# Patient Record
Sex: FEMALE | Race: ASIAN | NOT HISPANIC OR LATINO | URBAN - METROPOLITAN AREA
[De-identification: names, ages, dates, MRNs, and addresses within clinical notes are randomized per-mention and may not be internally consistent; named-entity substitution may affect disease eponyms.]

---

## 2018-01-06 ENCOUNTER — CHARTING TRANS (OUTPATIENT)
Dept: URGENT CARE | Age: 48
End: 2018-01-06

## 2018-01-06 ASSESSMENT — PAIN SCALES - GENERAL: PAINLEVEL_OUTOF10: 0

## 2018-11-27 VITALS
RESPIRATION RATE: 16 BRPM | OXYGEN SATURATION: 96 % | DIASTOLIC BLOOD PRESSURE: 82 MMHG | SYSTOLIC BLOOD PRESSURE: 112 MMHG | HEART RATE: 74 BPM | TEMPERATURE: 97.4 F

## 2019-12-02 ENCOUNTER — TELEPHONE (OUTPATIENT)
Dept: CARDIOLOGY | Age: 49
End: 2019-12-02

## 2020-01-01 ENCOUNTER — EXTERNAL RECORD (OUTPATIENT)
Dept: OTHER | Age: 50
End: 2020-01-01

## 2020-03-02 RX ORDER — LEVOTHYROXINE SODIUM 0.12 MG/1
125 TABLET ORAL DAILY
COMMUNITY
Start: 2018-02-20

## 2020-03-02 RX ORDER — ERGOCALCIFEROL 1.25 MG/1
CAPSULE ORAL
COMMUNITY
Start: 2019-02-19

## 2020-03-03 ENCOUNTER — OFFICE VISIT (OUTPATIENT)
Dept: CARDIOLOGY | Age: 50
End: 2020-03-03

## 2020-03-03 VITALS
DIASTOLIC BLOOD PRESSURE: 70 MMHG | HEIGHT: 63 IN | BODY MASS INDEX: 35.97 KG/M2 | SYSTOLIC BLOOD PRESSURE: 131 MMHG | HEART RATE: 90 BPM | WEIGHT: 203 LBS

## 2020-03-03 DIAGNOSIS — E03.9 ACQUIRED HYPOTHYROIDISM: ICD-10-CM

## 2020-03-03 DIAGNOSIS — R07.2 PRECORDIAL PAIN: Primary | ICD-10-CM

## 2020-03-03 DIAGNOSIS — I15.9 SECONDARY HYPERTENSION: ICD-10-CM

## 2020-03-03 PROCEDURE — 3075F SYST BP GE 130 - 139MM HG: CPT | Performed by: INTERNAL MEDICINE

## 2020-03-03 PROCEDURE — 99245 OFF/OP CONSLTJ NEW/EST HI 55: CPT | Performed by: INTERNAL MEDICINE

## 2020-03-03 PROCEDURE — 93000 ELECTROCARDIOGRAM COMPLETE: CPT | Performed by: INTERNAL MEDICINE

## 2020-03-03 PROCEDURE — 3078F DIAST BP <80 MM HG: CPT | Performed by: INTERNAL MEDICINE

## 2020-03-03 SDOH — HEALTH STABILITY: PHYSICAL HEALTH: ON AVERAGE, HOW MANY MINUTES DO YOU ENGAGE IN EXERCISE AT THIS LEVEL?: 90 MIN

## 2020-03-03 SDOH — HEALTH STABILITY: PHYSICAL HEALTH: ON AVERAGE, HOW MANY DAYS PER WEEK DO YOU ENGAGE IN MODERATE TO STRENUOUS EXERCISE (LIKE A BRISK WALK)?: 3 DAYS

## 2020-03-03 ASSESSMENT — PATIENT HEALTH QUESTIONNAIRE - PHQ9
2. FEELING DOWN, DEPRESSED OR HOPELESS: NOT AT ALL
SUM OF ALL RESPONSES TO PHQ9 QUESTIONS 1 AND 2: 0
SUM OF ALL RESPONSES TO PHQ9 QUESTIONS 1 AND 2: 0
1. LITTLE INTEREST OR PLEASURE IN DOING THINGS: NOT AT ALL

## 2020-05-12 ENCOUNTER — TELEPHONE (OUTPATIENT)
Dept: CARDIOLOGY | Age: 50
End: 2020-05-12

## 2020-05-13 ENCOUNTER — OFFICE VISIT (OUTPATIENT)
Dept: CARDIOLOGY | Age: 50
End: 2020-05-13

## 2020-05-13 VITALS — WEIGHT: 198 LBS | HEIGHT: 63 IN | BODY MASS INDEX: 35.08 KG/M2

## 2020-05-13 DIAGNOSIS — E03.9 ACQUIRED HYPOTHYROIDISM: ICD-10-CM

## 2020-05-13 DIAGNOSIS — R07.2 PRECORDIAL PAIN: Primary | ICD-10-CM

## 2020-05-13 PROCEDURE — 99213 OFFICE O/P EST LOW 20 MIN: CPT | Performed by: INTERNAL MEDICINE

## 2020-05-13 SDOH — HEALTH STABILITY: MENTAL HEALTH: HOW MANY STANDARD DRINKS CONTAINING ALCOHOL DO YOU HAVE ON A TYPICAL DAY?: 1 OR 2

## 2020-05-13 SDOH — HEALTH STABILITY: MENTAL HEALTH: HOW OFTEN DO YOU HAVE A DRINK CONTAINING ALCOHOL?: 2-4 TIMES A MONTH

## 2020-05-13 SDOH — HEALTH STABILITY: PHYSICAL HEALTH: ON AVERAGE, HOW MANY MINUTES DO YOU ENGAGE IN EXERCISE AT THIS LEVEL?: 50 MIN

## 2020-05-13 SDOH — HEALTH STABILITY: PHYSICAL HEALTH: ON AVERAGE, HOW MANY DAYS PER WEEK DO YOU ENGAGE IN MODERATE TO STRENUOUS EXERCISE (LIKE A BRISK WALK)?: 5 DAYS

## 2020-05-13 ASSESSMENT — ENCOUNTER SYMPTOMS
CHILLS: 0
ALLERGIC/IMMUNOLOGIC COMMENTS: NO NEW FOOD ALLERGIES
HEMOPTYSIS: 0
BRUISES/BLEEDS EASILY: 0
WEIGHT LOSS: 0
HEMATOCHEZIA: 0
FEVER: 0
SUSPICIOUS LESIONS: 0
COUGH: 0
WEIGHT GAIN: 0

## 2020-05-29 ENCOUNTER — E-ADVICE (OUTPATIENT)
Dept: CARDIOLOGY | Age: 50
End: 2020-05-29

## 2020-05-29 LAB
ALBUMIN SERPL-MCNC: 4.2 G/DL (ref 3.6–5.1)
ALBUMIN/GLOB SERPL: 1.5 (CALC) (ref 1–2.5)
ALP SERPL-CCNC: 60 U/L (ref 37–153)
ALT SERPL-CCNC: 18 U/L (ref 6–29)
AST SERPL-CCNC: 19 U/L (ref 10–35)
BASOPHILS # BLD AUTO: 118 CELLS/UL (ref 0–200)
BASOPHILS NFR BLD AUTO: 1 %
BILIRUB SERPL-MCNC: 0.5 MG/DL (ref 0.2–1.2)
BUN SERPL-MCNC: 15 MG/DL (ref 7–25)
BUN/CREAT SERPL: NORMAL (CALC) (ref 6–22)
CALCIUM SERPL-MCNC: 9.3 MG/DL (ref 8.6–10.4)
CHLORIDE SERPL-SCNC: 101 MMOL/L (ref 98–110)
CHOLEST SERPL-MCNC: 164 MG/DL
CHOLEST/HDLC SERPL: 2.7 (CALC)
CO2 SERPL-SCNC: 24 MMOL/L (ref 20–32)
CREAT SERPL-MCNC: 0.72 MG/DL (ref 0.5–1.05)
EOSINOPHIL # BLD AUTO: 224 CELLS/UL (ref 15–500)
EOSINOPHIL NFR BLD AUTO: 1.9 %
ERYTHROCYTE [DISTWIDTH] IN BLOOD BY AUTOMATED COUNT: 12.7 % (ref 11–15)
GFRSERPLBLD MDRD-ARVRAT: 98 ML/MIN/1.73M2
GLOBULIN SER CALC-MCNC: 2.8 G/DL (CALC) (ref 1.9–3.7)
GLUCOSE SERPL-MCNC: 75 MG/DL (ref 65–99)
HCT VFR BLD AUTO: 42.9 % (ref 35–45)
HDLC SERPL-MCNC: 61 MG/DL
HGB BLD-MCNC: 14.3 G/DL (ref 11.7–15.5)
LDLC SERPL CALC-MCNC: 82 MG/DL (CALC)
LYMPHOCYTES # BLD AUTO: 3941 CELLS/UL (ref 850–3900)
LYMPHOCYTES NFR BLD AUTO: 33.4 %
MCH RBC QN AUTO: 30.5 PG (ref 27–33)
MCHC RBC AUTO-ENTMCNC: 33.3 G/DL (ref 32–36)
MCV RBC AUTO: 91.5 FL (ref 80–100)
MONOCYTES # BLD AUTO: 838 CELLS/UL (ref 200–950)
MONOCYTES NFR BLD AUTO: 7.1 %
NEUTROPHILS # BLD AUTO: 6679 CELLS/UL (ref 1500–7800)
NEUTROPHILS NFR BLD AUTO: 56.6 %
NONHDLC SERPL-MCNC: 103 MG/DL (CALC)
PLATELET # BLD AUTO: 328 THOUSAND/UL (ref 140–400)
PMV BLD REES-ECKER: 10 FL (ref 7.5–12.5)
POTASSIUM SERPL-SCNC: 4.5 MMOL/L (ref 3.5–5.3)
PROT SERPL-MCNC: 7 G/DL (ref 6.1–8.1)
RBC # BLD AUTO: 4.69 MILLION/UL (ref 3.8–5.1)
SODIUM SERPL-SCNC: 137 MMOL/L (ref 135–146)
TRIGL SERPL-MCNC: 109 MG/DL
WBC # BLD AUTO: 11.8 THOUSAND/UL (ref 3.8–10.8)

## 2020-06-08 ENCOUNTER — LAB ENCOUNTER (OUTPATIENT)
Dept: LAB | Facility: HOSPITAL | Age: 50
End: 2020-06-08
Attending: INTERNAL MEDICINE
Payer: COMMERCIAL

## 2020-06-08 DIAGNOSIS — Z01.812 PRE-PROCEDURE LAB EXAM: Primary | ICD-10-CM

## 2020-06-08 LAB
SARS-COV-2 RNA SPEC QL NAA+PROBE: NOT DETECTED
SPECIMEN SOURCE: NORMAL

## 2020-06-09 ENCOUNTER — APPOINTMENT (OUTPATIENT)
Dept: CARDIOLOGY | Age: 50
End: 2020-06-09

## 2020-06-10 ENCOUNTER — HOSPITAL ENCOUNTER (OUTPATIENT)
Dept: CV DIAGNOSTICS | Age: 50
Discharge: HOME | End: 2020-06-10
Attending: INTERNAL MEDICINE
Payer: COMMERCIAL

## 2020-06-10 ENCOUNTER — APPOINTMENT (OUTPATIENT)
Dept: LAB | Age: 50
End: 2020-06-10
Attending: FAMILY MEDICINE
Payer: COMMERCIAL

## 2020-06-10 DIAGNOSIS — R07.2 PERICARDIAL PAIN: ICD-10-CM

## 2020-06-10 DIAGNOSIS — I15.9 BENIGN SECONDARY HYPERTENSION: ICD-10-CM

## 2020-06-10 DIAGNOSIS — E03.9 HYPOTHYROIDISM: ICD-10-CM

## 2020-06-10 PROCEDURE — 93018 CV STRESS TEST I&R ONLY: CPT | Performed by: INTERNAL MEDICINE

## 2020-06-10 PROCEDURE — 93350 STRESS TTE ONLY: CPT | Performed by: INTERNAL MEDICINE

## 2020-06-10 PROCEDURE — 93017 CV STRESS TEST TRACING ONLY: CPT | Performed by: INTERNAL MEDICINE

## 2020-06-15 ENCOUNTER — TELEPHONE (OUTPATIENT)
Dept: CARDIOLOGY | Age: 50
End: 2020-06-15

## 2020-06-19 RX ORDER — METOPROLOL SUCCINATE 25 MG/1
12.5 TABLET, EXTENDED RELEASE ORAL DAILY
Qty: 15 TABLET | Refills: 11 | Status: SHIPPED | OUTPATIENT
Start: 2020-06-19 | End: 2021-06-25

## 2020-06-25 ENCOUNTER — ORDER TRANSCRIPTION (OUTPATIENT)
Dept: ADMINISTRATIVE | Facility: HOSPITAL | Age: 50
End: 2020-06-25

## 2020-06-25 DIAGNOSIS — Z13.6 ENCOUNTER FOR SCREENING FOR CARDIOVASCULAR DISORDERS: Primary | ICD-10-CM

## 2020-06-26 ENCOUNTER — HOSPITAL ENCOUNTER (OUTPATIENT)
Dept: CT IMAGING | Age: 50
Discharge: HOME | End: 2020-06-26
Attending: FAMILY MEDICINE

## 2020-06-26 DIAGNOSIS — Z13.6 ENCOUNTER FOR SCREENING FOR CARDIOVASCULAR DISORDERS: ICD-10-CM

## 2020-09-08 ENCOUNTER — APPOINTMENT (OUTPATIENT)
Dept: CARDIOLOGY | Age: 50
End: 2020-09-08

## 2020-12-08 ENCOUNTER — V-VISIT (OUTPATIENT)
Dept: CARDIOLOGY | Age: 50
End: 2020-12-08

## 2020-12-08 VITALS
WEIGHT: 197 LBS | DIASTOLIC BLOOD PRESSURE: 70 MMHG | HEIGHT: 63 IN | SYSTOLIC BLOOD PRESSURE: 120 MMHG | HEART RATE: 65 BPM | BODY MASS INDEX: 34.91 KG/M2

## 2020-12-08 DIAGNOSIS — R82.998 URINE WBC INCREASED: ICD-10-CM

## 2020-12-08 DIAGNOSIS — D72.829 LEUKOCYTOSIS, UNSPECIFIED TYPE: ICD-10-CM

## 2020-12-08 DIAGNOSIS — R07.2 PRECORDIAL PAIN: Primary | ICD-10-CM

## 2020-12-08 DIAGNOSIS — R00.2 PALPITATIONS: ICD-10-CM

## 2020-12-08 DIAGNOSIS — E03.9 ACQUIRED HYPOTHYROIDISM: ICD-10-CM

## 2020-12-08 DIAGNOSIS — I47.19 ATRIAL TACHYCARDIA: ICD-10-CM

## 2020-12-08 PROCEDURE — 99214 OFFICE O/P EST MOD 30 MIN: CPT | Performed by: INTERNAL MEDICINE

## 2020-12-08 SDOH — HEALTH STABILITY: PHYSICAL HEALTH: ON AVERAGE, HOW MANY DAYS PER WEEK DO YOU ENGAGE IN MODERATE TO STRENUOUS EXERCISE (LIKE A BRISK WALK)?: 7 DAYS

## 2020-12-08 SDOH — HEALTH STABILITY: MENTAL HEALTH: HOW MANY STANDARD DRINKS CONTAINING ALCOHOL DO YOU HAVE ON A TYPICAL DAY?: 1 OR 2

## 2020-12-08 SDOH — HEALTH STABILITY: PHYSICAL HEALTH: ON AVERAGE, HOW MANY MINUTES DO YOU ENGAGE IN EXERCISE AT THIS LEVEL?: 60 MIN

## 2020-12-08 SDOH — HEALTH STABILITY: MENTAL HEALTH: HOW OFTEN DO YOU HAVE A DRINK CONTAINING ALCOHOL?: 2-4 TIMES A MONTH

## 2020-12-08 ASSESSMENT — ENCOUNTER SYMPTOMS
HEMOPTYSIS: 0
BRUISES/BLEEDS EASILY: 0
HEMATOCHEZIA: 0
CHILLS: 0
WEIGHT LOSS: 0
FEVER: 0
ALLERGIC/IMMUNOLOGIC COMMENTS: NO NEW FOOD ALLERGIES
SUSPICIOUS LESIONS: 0
COUGH: 0
WEIGHT GAIN: 0

## 2020-12-08 ASSESSMENT — PATIENT HEALTH QUESTIONNAIRE - PHQ9
CLINICAL INTERPRETATION OF PHQ2 SCORE: NO FURTHER SCREENING NEEDED
1. LITTLE INTEREST OR PLEASURE IN DOING THINGS: NOT AT ALL
SUM OF ALL RESPONSES TO PHQ9 QUESTIONS 1 AND 2: 0
CLINICAL INTERPRETATION OF PHQ9 SCORE: NO FURTHER SCREENING NEEDED
2. FEELING DOWN, DEPRESSED OR HOPELESS: NOT AT ALL
SUM OF ALL RESPONSES TO PHQ9 QUESTIONS 1 AND 2: 0

## 2021-06-25 RX ORDER — METOPROLOL SUCCINATE 25 MG/1
12.5 TABLET, EXTENDED RELEASE ORAL DAILY
Qty: 15 TABLET | Refills: 3 | Status: SHIPPED | OUTPATIENT
Start: 2021-06-25

## 2022-11-15 ENCOUNTER — PATIENT MESSAGE (OUTPATIENT)
Dept: INTERNAL MEDICINE CLINIC | Facility: CLINIC | Age: 52
End: 2022-11-15

## 2022-11-15 ENCOUNTER — OFFICE VISIT (OUTPATIENT)
Dept: INTERNAL MEDICINE CLINIC | Facility: CLINIC | Age: 52
End: 2022-11-15
Payer: COMMERCIAL

## 2022-11-15 VITALS
RESPIRATION RATE: 14 BRPM | BODY MASS INDEX: 36.19 KG/M2 | WEIGHT: 212 LBS | SYSTOLIC BLOOD PRESSURE: 122 MMHG | HEIGHT: 64.37 IN | OXYGEN SATURATION: 98 % | TEMPERATURE: 98 F | HEART RATE: 71 BPM | DIASTOLIC BLOOD PRESSURE: 70 MMHG

## 2022-11-15 DIAGNOSIS — D17.21 LIPOMA OF RIGHT SHOULDER: ICD-10-CM

## 2022-11-15 DIAGNOSIS — G89.29 CHRONIC RIGHT SHOULDER PAIN: Primary | ICD-10-CM

## 2022-11-15 DIAGNOSIS — M25.511 CHRONIC RIGHT SHOULDER PAIN: Primary | ICD-10-CM

## 2022-11-15 PROBLEM — Z86.39 HISTORY OF GRAVES' DISEASE: Status: ACTIVE | Noted: 2022-11-15

## 2022-11-15 PROBLEM — Z90.710 HISTORY OF HYSTERECTOMY: Status: ACTIVE | Noted: 2022-11-15

## 2022-11-15 PROCEDURE — 90686 IIV4 VACC NO PRSV 0.5 ML IM: CPT | Performed by: INTERNAL MEDICINE

## 2022-11-15 PROCEDURE — 3074F SYST BP LT 130 MM HG: CPT | Performed by: INTERNAL MEDICINE

## 2022-11-15 PROCEDURE — 90471 IMMUNIZATION ADMIN: CPT | Performed by: INTERNAL MEDICINE

## 2022-11-15 PROCEDURE — 3078F DIAST BP <80 MM HG: CPT | Performed by: INTERNAL MEDICINE

## 2022-11-15 PROCEDURE — 3008F BODY MASS INDEX DOCD: CPT | Performed by: INTERNAL MEDICINE

## 2022-11-15 PROCEDURE — 99203 OFFICE O/P NEW LOW 30 MIN: CPT | Performed by: INTERNAL MEDICINE

## 2022-11-15 RX ORDER — ALCLOMETASONE DIPROPIONATE 0.5 MG/G
CREAM TOPICAL
COMMUNITY
Start: 2022-08-20

## 2022-11-15 RX ORDER — LEVOTHYROXINE SODIUM 125 MCG
TABLET ORAL
COMMUNITY
Start: 2022-09-17

## 2022-11-15 RX ORDER — ALBUTEROL SULFATE 90 UG/1
1-2 AEROSOL, METERED RESPIRATORY (INHALATION)
COMMUNITY
Start: 2018-01-06

## 2022-11-15 RX ORDER — ERGOCALCIFEROL 1.25 MG/1
CAPSULE ORAL
COMMUNITY
Start: 2022-09-06

## 2022-11-15 RX ORDER — METOPROLOL SUCCINATE 25 MG/1
TABLET, EXTENDED RELEASE ORAL
COMMUNITY
Start: 2022-09-08

## 2022-11-15 NOTE — PATIENT INSTRUCTIONS
You received a flu vaccine today    Please schedule an appointment with our orthopedic surgeon to see if the lipoma is indeed a cause of your pain

## 2022-11-29 ENCOUNTER — TELEPHONE (OUTPATIENT)
Dept: ORTHOPEDICS CLINIC | Facility: CLINIC | Age: 52
End: 2022-11-29

## 2022-11-29 ENCOUNTER — HOSPITAL ENCOUNTER (OUTPATIENT)
Dept: GENERAL RADIOLOGY | Age: 52
Discharge: HOME | End: 2022-11-29
Attending: INTERNAL MEDICINE
Payer: COMMERCIAL

## 2022-11-29 DIAGNOSIS — G89.29 CHRONIC RIGHT SHOULDER PAIN: ICD-10-CM

## 2022-11-29 DIAGNOSIS — M25.511 CHRONIC RIGHT SHOULDER PAIN: ICD-10-CM

## 2022-11-29 PROCEDURE — 73030 X-RAY EXAM OF SHOULDER: CPT | Performed by: INTERNAL MEDICINE

## 2022-11-30 NOTE — TELEPHONE ENCOUNTER
Impression   CONCLUSION: [facility] joint arthritis. Xrays completed on 11/29/22 with the above findings. No new imaging needed at this time.

## 2022-11-30 NOTE — TELEPHONE ENCOUNTER
Imaging for this patient was completed for the RT SHOULDER, but it needs to be reviewed to see if additional imaging is needed. If so, please enter the appropriate RX and let the patient know to come in before their appointment. Thank you!     Future Appointments   Date Time Provider Anderson Rhodes   12/5/2022  2:00 PM Prema Solis MD [facility] [id number]

## 2022-12-05 ENCOUNTER — OFFICE VISIT (OUTPATIENT)
Dept: ORTHOPEDICS CLINIC | Facility: CLINIC | Age: 52
End: 2022-12-05
Payer: COMMERCIAL

## 2022-12-05 VITALS — HEIGHT: 63 IN | WEIGHT: 205 LBS | BODY MASS INDEX: 36.32 KG/M2

## 2022-12-05 DIAGNOSIS — M75.01 ADHESIVE CAPSULITIS OF RIGHT SHOULDER: Primary | ICD-10-CM

## 2022-12-05 PROCEDURE — 99204 OFFICE O/P NEW MOD 45 MIN: CPT | Performed by: ORTHOPAEDIC SURGERY

## 2022-12-05 PROCEDURE — 3008F BODY MASS INDEX DOCD: CPT | Performed by: ORTHOPAEDIC SURGERY

## 2022-12-05 PROCEDURE — 20610 DRAIN/INJ JOINT/BURSA W/O US: CPT | Performed by: ORTHOPAEDIC SURGERY

## 2022-12-05 RX ORDER — KETOROLAC TROMETHAMINE 30 MG/ML
30 INJECTION, SOLUTION INTRAMUSCULAR; INTRAVENOUS ONCE
Status: COMPLETED | OUTPATIENT
Start: 2022-12-05 | End: 2022-12-05

## 2022-12-05 RX ORDER — TRIAMCINOLONE ACETONIDE 40 MG/ML
40 INJECTION, SUSPENSION INTRA-ARTICULAR; INTRAMUSCULAR ONCE
Status: COMPLETED | OUTPATIENT
Start: 2022-12-05 | End: 2022-12-05

## 2022-12-05 RX ADMIN — TRIAMCINOLONE ACETONIDE 40 MG: 40 INJECTION, SUSPENSION INTRA-ARTICULAR; INTRAMUSCULAR at 14:48:00

## 2022-12-05 RX ADMIN — KETOROLAC TROMETHAMINE 30 MG: 30 INJECTION, SOLUTION INTRAMUSCULAR; INTRAVENOUS at 14:48:00

## 2022-12-05 NOTE — PROCEDURES
Right Shoulder Glenohumeral Joint Injection    Name: Jameel Ramirez   MRN: [id number]  Date: 12/5/2022     Clinical Indications:   Adhesive Capsulitis. After informed consent, the injection site was marked, sterilized with topical chlorhexidine antiseptic, and locally anesthetized with skin refrigerant. The patient was seated upright and the shoulder was exposed. Using sterile technique: 1 mL of 30mg/mL of Ketorolac, 2 mL of 0.5% Bupivicaine, 2 mL of 1% Lidocaine, and 1 mg of 40mg/mL of Triamcinolone (Kenalog) was injected with a Anterior approach utilizing a 21 gauge needle. A band-aid was applied. The patient tolerated the procedure well. Disposition:   Proceed with physical therapy and return for repeat evaluation in 6 weeks. No imaging required at next visit. Rafat Mckeon. Jose Deras MD  Knee, Shoulder, & Elbow Surgery / Sports Medicine Specialist  [facility]  [place] [address], [facility], [facility]0 [facility]. [email or web address]  t: [phone]  o: [phone]  f: [phone]

## 2022-12-29 ENCOUNTER — MED REC SCAN ONLY (OUTPATIENT)
Dept: ORTHOPEDICS CLINIC | Facility: CLINIC | Age: 52
End: 2022-12-29

## 2023-03-16 ENCOUNTER — OFFICE VISIT (OUTPATIENT)
Dept: OBGYN CLINIC | Facility: CLINIC | Age: 53
End: 2023-03-16

## 2023-03-16 ENCOUNTER — LAB ENCOUNTER (OUTPATIENT)
Dept: LAB | Age: 53
End: 2023-03-16
Attending: OBSTETRICS & GYNECOLOGY
Payer: COMMERCIAL

## 2023-03-16 VITALS
SYSTOLIC BLOOD PRESSURE: 124 MMHG | BODY MASS INDEX: 36.5 KG/M2 | RESPIRATION RATE: 18 BRPM | DIASTOLIC BLOOD PRESSURE: 78 MMHG | HEIGHT: 63 IN | WEIGHT: 206 LBS

## 2023-03-16 DIAGNOSIS — F41.9 ANXIETY: ICD-10-CM

## 2023-03-16 DIAGNOSIS — E03.9 HYPOTHYROIDISM, UNSPECIFIED TYPE: Primary | ICD-10-CM

## 2023-03-16 LAB — TSI SER-ACNC: 0.63 MIU/ML (ref 0.36–3.74)

## 2023-03-16 PROCEDURE — 84443 ASSAY THYROID STIM HORMONE: CPT | Performed by: OBSTETRICS & GYNECOLOGY

## 2023-03-16 PROCEDURE — 99396 PREV VISIT EST AGE 40-64: CPT | Performed by: OBSTETRICS & GYNECOLOGY

## 2023-03-16 PROCEDURE — 3008F BODY MASS INDEX DOCD: CPT | Performed by: OBSTETRICS & GYNECOLOGY

## 2023-03-16 PROCEDURE — 3074F SYST BP LT 130 MM HG: CPT | Performed by: OBSTETRICS & GYNECOLOGY

## 2023-03-16 PROCEDURE — 3078F DIAST BP <80 MM HG: CPT | Performed by: OBSTETRICS & GYNECOLOGY

## 2023-03-16 RX ORDER — ESCITALOPRAM OXALATE 10 MG/1
10 TABLET ORAL DAILY
Qty: 90 TABLET | Refills: 4 | Status: SHIPPED | OUTPATIENT
Start: 2023-03-16

## 2023-05-17 RX ORDER — ESTRADIOL 0.5 MG/.5G
1 GEL TOPICAL EVERY EVENING
Qty: 16 EACH | Refills: 0 | Status: SHIPPED | OUTPATIENT
Start: 2023-05-17

## 2023-05-18 ENCOUNTER — TELEPHONE (OUTPATIENT)
Dept: OBGYN CLINIC | Facility: CLINIC | Age: 53
End: 2023-05-18

## 2023-05-18 NOTE — TELEPHONE ENCOUNTER
Pt Name and  verified. Pt informed that they do have samples at [facility] location, confirmed with Elzbieta Royal. Pt CHIARA.

## 2023-05-23 ENCOUNTER — TELEPHONE (OUTPATIENT)
Dept: OBGYN CLINIC | Facility: CLINIC | Age: 53
End: 2023-05-23

## 2023-05-23 RX ORDER — ESTRADIOL 0.5 MG/.5G
1 GEL TOPICAL EVERY EVENING
Qty: 90 EACH | Refills: 0 | Status: SHIPPED | OUTPATIENT
Start: 2023-05-23

## 2023-09-26 RX ORDER — ESTRADIOL 0.5 MG/.5G
GEL TOPICAL
Qty: 90 EACH | Refills: 3 | Status: SHIPPED | OUTPATIENT
Start: 2023-09-26

## 2023-09-28 ENCOUNTER — OFFICE VISIT (OUTPATIENT)
Dept: INTERNAL MEDICINE CLINIC | Facility: CLINIC | Age: 53
End: 2023-09-28
Payer: COMMERCIAL

## 2023-09-28 VITALS
TEMPERATURE: 98 F | SYSTOLIC BLOOD PRESSURE: 122 MMHG | HEIGHT: 63 IN | OXYGEN SATURATION: 97 % | HEART RATE: 62 BPM | RESPIRATION RATE: 14 BRPM | BODY MASS INDEX: 39.72 KG/M2 | WEIGHT: 224.19 LBS | DIASTOLIC BLOOD PRESSURE: 68 MMHG

## 2023-09-28 DIAGNOSIS — E66.9 CLASS 2 OBESITY WITHOUT SERIOUS COMORBIDITY WITH BODY MASS INDEX (BMI) OF 39.0 TO 39.9 IN ADULT, UNSPECIFIED OBESITY TYPE: ICD-10-CM

## 2023-09-28 DIAGNOSIS — F41.9 ANXIETY: ICD-10-CM

## 2023-09-28 DIAGNOSIS — Z00.00 ROUTINE PHYSICAL EXAMINATION: Primary | ICD-10-CM

## 2023-09-28 DIAGNOSIS — E78.2 MIXED HYPERLIPIDEMIA: ICD-10-CM

## 2023-09-28 DIAGNOSIS — Z12.31 SCREENING MAMMOGRAM FOR BREAST CANCER: ICD-10-CM

## 2023-09-28 DIAGNOSIS — Z86.39 HISTORY OF GRAVES' DISEASE: ICD-10-CM

## 2023-09-28 PROBLEM — Z82.49 FAMILY HISTORY OF HEART DISEASE: Status: ACTIVE | Noted: 2023-09-28

## 2023-09-28 PROBLEM — E66.812 CLASS 2 OBESITY WITHOUT SERIOUS COMORBIDITY WITH BODY MASS INDEX (BMI) OF 39.0 TO 39.9 IN ADULT: Status: ACTIVE | Noted: 2023-09-28

## 2023-09-28 LAB
TSH: 0.45 UIU/ML
VITAMIN D, 25-HYDROXY: 54 NG/ML

## 2023-09-28 PROCEDURE — 3078F DIAST BP <80 MM HG: CPT | Performed by: INTERNAL MEDICINE

## 2023-09-28 PROCEDURE — 99396 PREV VISIT EST AGE 40-64: CPT | Performed by: INTERNAL MEDICINE

## 2023-09-28 PROCEDURE — 90686 IIV4 VACC NO PRSV 0.5 ML IM: CPT | Performed by: INTERNAL MEDICINE

## 2023-09-28 PROCEDURE — 3074F SYST BP LT 130 MM HG: CPT | Performed by: INTERNAL MEDICINE

## 2023-09-28 PROCEDURE — 3008F BODY MASS INDEX DOCD: CPT | Performed by: INTERNAL MEDICINE

## 2023-09-28 PROCEDURE — 90471 IMMUNIZATION ADMIN: CPT | Performed by: INTERNAL MEDICINE

## 2023-09-28 RX ORDER — ROSUVASTATIN CALCIUM 5 MG/1
5 TABLET, COATED ORAL DAILY
COMMUNITY
Start: 2023-06-26

## 2023-09-28 RX ORDER — CETIRIZINE HYDROCHLORIDE 10 MG/1
10 TABLET ORAL DAILY
COMMUNITY

## 2023-09-28 NOTE — PATIENT INSTRUCTIONS
Continue to exercise at least 150 minutes a week and Eat a plant based diet     Please take 2000 IU of vitamin D daily to keep your bones strong    You can talk to Dr. Mike Mckeon to see if you can take crestor every other day due to the muscle aches or try pravastatin ( a different cholesterol pill). Also ask Dr. Mike Mckeon if phentermine can be given for you for weight loss (just short term like 6 months) if we need that in the future. Another weight loss medication is topamax that we give for migraines. Most common side effects are numbness/hair loss/drowsiness    Try intermittent fasting (fasting for 12-16 hours). Avoid carbohydrates at night time    I do recommend the shingles vaccine (2 dose series).  You received the flu vaccine today    I have ordered a mammogram as well    See you yearly for a physical

## 2023-09-29 ENCOUNTER — PATIENT MESSAGE (OUTPATIENT)
Dept: INTERNAL MEDICINE CLINIC | Facility: CLINIC | Age: 53
End: 2023-09-29

## 2023-09-29 DIAGNOSIS — E66.9 CLASS 2 OBESITY WITHOUT SERIOUS COMORBIDITY WITH BODY MASS INDEX (BMI) OF 39.0 TO 39.9 IN ADULT, UNSPECIFIED OBESITY TYPE: Primary | ICD-10-CM

## 2023-10-02 RX ORDER — PHENTERMINE HYDROCHLORIDE 37.5 MG/1
37.5 TABLET ORAL
Qty: 60 TABLET | Refills: 0 | Status: SHIPPED | OUTPATIENT
Start: 2023-10-02

## 2023-10-02 NOTE — TELEPHONE ENCOUNTER
From: Khushbu Turcios  To: Cydney Concepcionus  Sent: 9/29/2023 4:09 PM CDT  Subject: HS CRP    Saraylo Dr. Inez Bedolla  The cardiologist, Dr Oj Gomez, wanted me to ask you about my elevated HS CRP 3.5 from the 9/12/2023 blood draw. Would you provide feedback that I may share with her? Additionally, the cardiologist approved phentermine for a short term use. After thinking about it, I would be interested in giving it a try. Would you be able to call that into [facility] on [address] [phone]. Since we are only talking short term use?    Warm regards,  Violeta Lynneaw

## 2023-11-04 ENCOUNTER — OFFICE VISIT (OUTPATIENT)
Dept: INTERNAL MEDICINE CLINIC | Facility: CLINIC | Age: 53
End: 2023-11-04
Payer: COMMERCIAL

## 2023-11-04 VITALS
BODY MASS INDEX: 38.59 KG/M2 | HEART RATE: 63 BPM | DIASTOLIC BLOOD PRESSURE: 72 MMHG | WEIGHT: 217.81 LBS | HEIGHT: 63 IN | OXYGEN SATURATION: 98 % | RESPIRATION RATE: 14 BRPM | TEMPERATURE: 98 F | SYSTOLIC BLOOD PRESSURE: 122 MMHG

## 2023-11-04 DIAGNOSIS — E66.9 CLASS 2 OBESITY WITHOUT SERIOUS COMORBIDITY WITH BODY MASS INDEX (BMI) OF 39.0 TO 39.9 IN ADULT, UNSPECIFIED OBESITY TYPE: Primary | ICD-10-CM

## 2023-11-04 DIAGNOSIS — Z82.49 FAMILY HISTORY OF HEART DISEASE: ICD-10-CM

## 2023-11-04 PROCEDURE — 99214 OFFICE O/P EST MOD 30 MIN: CPT | Performed by: INTERNAL MEDICINE

## 2023-11-04 PROCEDURE — 3078F DIAST BP <80 MM HG: CPT | Performed by: INTERNAL MEDICINE

## 2023-11-04 PROCEDURE — 3074F SYST BP LT 130 MM HG: CPT | Performed by: INTERNAL MEDICINE

## 2023-11-04 PROCEDURE — 3008F BODY MASS INDEX DOCD: CPT | Performed by: INTERNAL MEDICINE

## 2023-11-04 RX ORDER — PHENTERMINE HYDROCHLORIDE 37.5 MG/1
37.5 TABLET ORAL
Qty: 90 TABLET | Refills: 0 | Status: SHIPPED | OUTPATIENT
Start: 2023-11-04

## 2023-11-04 NOTE — PATIENT INSTRUCTIONS
Great job on the weight loss. Lets continue the medication for another 3 months.   I agree with keeping a reminder to eat as it is important to eat as well    Continue metoprolol and cholesterol medication    Get your shingles vaccine through the pharmacy and it is a 2 dose series and it will cause fatigue for 24 to 48 hours    See me back in 3 months for a weight check

## 2023-12-04 ENCOUNTER — HOSPITAL ENCOUNTER (OUTPATIENT)
Dept: MAMMOGRAPHY | Age: 53
Discharge: HOME | End: 2023-12-04
Attending: INTERNAL MEDICINE
Payer: COMMERCIAL

## 2023-12-04 DIAGNOSIS — Z12.31 SCREENING MAMMOGRAM FOR BREAST CANCER: ICD-10-CM

## 2023-12-04 PROCEDURE — 77063 BREAST TOMOSYNTHESIS BI: CPT | Performed by: INTERNAL MEDICINE

## 2023-12-04 PROCEDURE — 77067 SCR MAMMO BI INCL CAD: CPT | Performed by: INTERNAL MEDICINE

## 2023-12-12 ENCOUNTER — HOSPITAL ENCOUNTER (OUTPATIENT)
Dept: ULTRASOUND IMAGING | Age: 53
Discharge: HOME | End: 2023-12-12
Attending: INTERNAL MEDICINE
Payer: COMMERCIAL

## 2023-12-12 ENCOUNTER — HOSPITAL ENCOUNTER (OUTPATIENT)
Dept: MAMMOGRAPHY | Age: 53
Discharge: HOME | End: 2023-12-12
Attending: INTERNAL MEDICINE
Payer: COMMERCIAL

## 2023-12-12 DIAGNOSIS — R92.2 INCONCLUSIVE MAMMOGRAM: ICD-10-CM

## 2023-12-12 PROCEDURE — 77061 BREAST TOMOSYNTHESIS UNI: CPT | Performed by: INTERNAL MEDICINE

## 2023-12-12 PROCEDURE — 76642 ULTRASOUND BREAST LIMITED: CPT | Performed by: INTERNAL MEDICINE

## 2023-12-12 PROCEDURE — 77065 DX MAMMO INCL CAD UNI: CPT | Performed by: INTERNAL MEDICINE

## 2023-12-13 DIAGNOSIS — N63.20 MASS OF LEFT BREAST, UNSPECIFIED QUADRANT: Primary | ICD-10-CM

## 2024-01-30 LAB
ABSOLUTE BASOPHILS: 100 CELLS/UL (ref 0–200)
ABSOLUTE EOSINOPHILS: 250 CELLS/UL (ref 15–500)
ABSOLUTE LYMPHOCYTES: 3190 CELLS/UL (ref 850–3900)
ABSOLUTE MONOCYTES: 740 CELLS/UL (ref 200–950)
ABSOLUTE NEUTROPHILS: 5720 CELLS/UL (ref 1500–7800)
BASOPHILS: 1 %
EOSINOPHILS: 2.5 %
HEMATOCRIT: 43.5 % (ref 35–45)
HEMOGLOBIN: 14.2 G/DL (ref 11.7–15.5)
LYMPHOCYTES: 31.9 %
MCH: 30.1 PG (ref 27–33)
MCHC: 32.6 G/DL (ref 32–36)
MCV: 92.2 FL (ref 80–100)
MONOCYTES: 7.4 %
MPV: 9.7 FL (ref 7.5–12.5)
NEUTROPHILS: 57.2 %
PLATELET COUNT: 333 THOUSAND/UL (ref 140–400)
RDW: 12.5 % (ref 11–15)
RED BLOOD CELL COUNT: 4.72 MILLION/UL (ref 3.8–5.1)
WHITE BLOOD CELL COUNT: 10 THOUSAND/UL (ref 3.8–10.8)

## 2024-02-08 ENCOUNTER — OFFICE VISIT (OUTPATIENT)
Dept: INTERNAL MEDICINE CLINIC | Facility: CLINIC | Age: 54
End: 2024-02-08
Payer: COMMERCIAL

## 2024-02-08 VITALS
HEART RATE: 88 BPM | HEIGHT: 63 IN | WEIGHT: 226.38 LBS | SYSTOLIC BLOOD PRESSURE: 122 MMHG | TEMPERATURE: 98 F | RESPIRATION RATE: 14 BRPM | OXYGEN SATURATION: 96 % | BODY MASS INDEX: 40.11 KG/M2 | DIASTOLIC BLOOD PRESSURE: 74 MMHG

## 2024-02-08 DIAGNOSIS — R06.02 SOB (SHORTNESS OF BREATH): ICD-10-CM

## 2024-02-08 DIAGNOSIS — E66.9 CLASS 2 OBESITY WITHOUT SERIOUS COMORBIDITY WITH BODY MASS INDEX (BMI) OF 39.0 TO 39.9 IN ADULT, UNSPECIFIED OBESITY TYPE: Primary | ICD-10-CM

## 2024-02-08 DIAGNOSIS — R21 RASH: ICD-10-CM

## 2024-02-08 PROBLEM — G47.33 OSA ON CPAP: Status: ACTIVE | Noted: 2024-02-08

## 2024-02-08 PROCEDURE — 3078F DIAST BP <80 MM HG: CPT | Performed by: INTERNAL MEDICINE

## 2024-02-08 PROCEDURE — 3074F SYST BP LT 130 MM HG: CPT | Performed by: INTERNAL MEDICINE

## 2024-02-08 PROCEDURE — 99213 OFFICE O/P EST LOW 20 MIN: CPT | Performed by: INTERNAL MEDICINE

## 2024-02-08 PROCEDURE — 90471 IMMUNIZATION ADMIN: CPT | Performed by: INTERNAL MEDICINE

## 2024-02-08 PROCEDURE — 90750 HZV VACC RECOMBINANT IM: CPT | Performed by: INTERNAL MEDICINE

## 2024-02-08 PROCEDURE — 3008F BODY MASS INDEX DOCD: CPT | Performed by: INTERNAL MEDICINE

## 2024-02-08 RX ORDER — TIRZEPATIDE 2.5 MG/.5ML
2.5 INJECTION, SOLUTION SUBCUTANEOUS WEEKLY
Qty: 1 ML | Refills: 0 | Status: SHIPPED | OUTPATIENT
Start: 2024-02-08

## 2024-02-08 RX ORDER — PRENATAL VIT 91/IRON/FOLIC/DHA 28-975-200
COMBINATION PACKAGE (EA) ORAL
Qty: 42 G | Refills: 0 | Status: SHIPPED | OUTPATIENT
Start: 2024-02-08

## 2024-02-08 RX ORDER — PHENTERMINE HYDROCHLORIDE 15 MG/1
CAPSULE ORAL
Qty: 7 CAPSULE | Refills: 0 | Status: SHIPPED | OUTPATIENT
Start: 2024-02-08

## 2024-02-08 NOTE — PROGRESS NOTES
Patient Office Visit    ASSESSMENT AND PLAN:   1. Class 2 obesity without serious comorbidity with body mass index (BMI) of 39.0 to 39.9 in adult, unspecified obesity type  Note: Will wean off phentermine as below.  Discussed about zepbound.  She denies any personal or family history of medullary thyroid cancer or pancreatitis.  Discussed the side effects of nausea, vomiting, diarrhea or constipation.  If approved and patient will follow-up with the nurse for teaching.  Referral to the dietitian provided as well.  - Phentermine HCl 15 MG Oral Cap; Take every other day for 2 weeks then stop  Dispense: 7 capsule; Refill: 0  - Tirzepatide-Weight Management (ZEPBOUND) 2.5 MG/0.5ML Subcutaneous Solution Auto-injector; Inject 2.5 mg into the skin once a week.  Dispense: 1 mL; Refill: 0  - DIETITIAN EDUCATION INITIAL, DIET (INTERNAL)    2. SOB (shortness of breath)  Note: This could be weight related however will order chest x-ray and patient has an appointment with  her cardiologist today  - XR CHEST PA + LAT CHEST (CPT=71046); Future    3. Rash  Note: Will try treatment below and has a follow-up appointment with dermatology  - terbinafine 1 % External Cream; Apply twice a day to affected area for at least 4 weeks  Dispense: 42 g; Refill: 0    Return to clinic in 6 weeks  Shingles vaccine provided      Patient/Caregiver Education: Patient/Caregiver Education: There are no barriers to learning. Medical education done. Outcome: Patient verbalizes understanding. Patient is notified to call with any questions, complications, allergies, or worsening or changing symptoms.  Patient is to call with any side effects or complications from the treatments as a result of today.      Reviewed Past Medical History and   Patient Active Problem List   Diagnosis    History of hysterectomy    History of Graves' disease    Family history of heart disease    Anxiety    Mixed hyperlipidemia    Class 2 obesity without serious comorbidity with  body mass index (BMI) of 39.0 to 39.9 in adult       Orders Placed This Encounter   Procedures    Zoster Recombinant Adjuvanted (Shingrix -Shingles) [09545]     Requested Prescriptions     Signed Prescriptions Disp Refills    Phentermine HCl 15 MG Oral Cap 7 capsule 0     Sig: Take every other day for 2 weeks then stop    Tirzepatide-Weight Management (ZEPBOUND) 2.5 MG/0.5ML Subcutaneous Solution Auto-injector 1 mL 0     Sig: Inject 2.5 mg into the skin once a week.    terbinafine 1 % External Cream 42 g 0     Sig: Apply twice a day to affected area for at least 4 weeks         Cydney Ovalles DO  CC:  Chief Complaint   Patient presents with    Weight Check         HPI:   Sonja Hines is a 53-year-old female who presents for a weight check    Obesity: Phentermine is not working anymore and she has gained more weight.  She is interested in trying something different.  She is trying to work on diet and lifestyle changes however she has noticed some labored breathing when she is trying to work out for a longer time.  She denies any recent viral infections.  She has been compliant with her CPAP machine as well.  Denies any chest pain and will be following up with her cardiologist today.  Interested in seeing a nutritionist  Rash: Has noticed yellow dots on the chest which are increasing in size and amount.  It does get itchy.  No new products reported.    Past Medical History:   Diagnosis Date    Allergic rhinitis     Anxiety     Not formally diagnosed    Graves disease     Hyperthyroidism     Graves disease. Had AJ now on Synthoid    Hypothyroidism     After AJ    Sleep apnea        Past Surgical History:   Procedure Laterality Date    D & C      HYSTERECTOMY  2018    partial hystero.      ,     SKIN SURGERY      TONSILLECTOMY         Social History:  Social History     Socioeconomic History    Marital status:    Tobacco Use    Smoking status: Never    Smokeless tobacco: Never   Vaping  Use    Vaping Use: Never used   Substance and Sexual Activity    Alcohol use: Yes     Comment: Occasionally when out to dinner with friends    Drug use: Not Currently     Types: Cannabis   Other Topics Concern    Caffeine Concern No    Exercise Yes     Comment: 1-3x per week    Seat Belt Yes    Special Diet Yes     Comment: Avoid dairy, lactose intolerance    Stress Concern No    Weight Concern Yes     Family History:  Family History   Problem Relation Age of Onset    Heart Disorder Father     Diabetes Father     Hypertension Father     Heart Disorder Mother     Hypertension Mother     Hypertension Sister      Allergies:  Allergies   Allergen Reactions    Azithromycin HIVES    Erythromycin OTHER (SEE COMMENTS) and UNKNOWN    Penicillins HIVES and OTHER (SEE COMMENTS)    Sulfa Antibiotics HIVES    Amoxicillin DIZZINESS    Milk Protein Extract OTHER (SEE COMMENTS)     Current Meds:  Current Outpatient Medications on File Prior to Visit   Medication Sig Dispense Refill    rosuvastatin 5 MG Oral Tab Take 1 tablet (5 mg total) by mouth daily.      cetirizine 10 MG Oral Tab Take 1 tablet (10 mg total) by mouth daily.      Estradiol 0.5 MG/0.5GM Transdermal Gel APPLY 1 DOSE TO THE AFFECTED AREA EVERY EVENING 90 each 3    ergocalciferol 1.25 MG (51022 UT) Oral Cap       escitalopram (LEXAPRO) 10 MG Oral Tab Take 1 tablet (10 mg total) by mouth daily. 90 tablet 4    metoprolol succinate ER 25 MG Oral Tablet 24 Hr Take 1 tablet (25 mg total) by mouth daily. Half of 25mg      SYNTHROID 125 MCG Oral Tab       B Complex-C-Folic Acid (B-COMPLEX/VITAMIN C OR) Take 1 tablet by mouth daily.       No current facility-administered medications on file prior to visit.         REVIEW OF SYSTEMS   Constitutional: no fatigue normal energy no weight changes   HENT: normal sinuses and no mouth issues   Eyes: . normal vision no eye pain   Respiratory: normal respirations no cough   Cardiovascular: no CP, or palpitations   Gastrointestinal:  normal bowels and no abd pains   Genitourinary:  normal urination no hematuria, no frequency   Musculoskeletal: no pains in arms/legs, normal range of motion   Skin: as above   Neurological:  no weakness, no numbness, normal gait   Hematological:  no bruises or bleeding   Psychiatric/Behavioral: normal mood no anxiety normal behavior     /74 (BP Location: Right arm, Patient Position: Sitting, Cuff Size: adult)   Pulse 88   Temp 98.1 °F (36.7 °C) (Temporal)   Resp 14   Ht 5' 3\" (1.6 m)   Wt 226 lb 6.4 oz (102.7 kg)   SpO2 96%   BMI 40.10 kg/m²     PHYSICAL EXAM:   Constitutional: Vital signs reviewed as noted, well developed, in no acute distress.   HENT: NCAT  Eyes: pupils reactive bilaterally  Cardiovascular: nl s1 s2 no m/r/g  Pulmonary/Chest: CTA bilaterally with no wheezes  Extremities: no pedal edema   Neurological:  no weakness in UE and LE, reflexes are normal  Skin: Clearing of the skin noted on the chest  Psychiatric:normal mood

## 2024-02-08 NOTE — PATIENT INSTRUCTIONS
Lets wean off phentermine, but taking 15 mg every other day for 2 weeks then stop.    They will see if zepbound is approved.  You did not mention any personal or family history of medullary thyroid cancer or pancreatitis.  Most common side effects of constipation, diarrhea, nausea and vomiting    If medication is approved then please come schedule an appointment with the nurse    You received the shingles vaccine today    I have referred you to the dietitian  I have ordered a chest x-ray for you  Try the over-the-counter athlete's foot cream for the rash and follow-up with the dermatologist

## 2024-02-16 ENCOUNTER — PATIENT MESSAGE (OUTPATIENT)
Dept: INTERNAL MEDICINE CLINIC | Facility: CLINIC | Age: 54
End: 2024-02-16

## 2024-02-16 DIAGNOSIS — E66.9 CLASS 2 OBESITY WITHOUT SERIOUS COMORBIDITY WITH BODY MASS INDEX (BMI) OF 39.0 TO 39.9 IN ADULT, UNSPECIFIED OBESITY TYPE: Primary | ICD-10-CM

## 2024-02-19 NOTE — TELEPHONE ENCOUNTER
PA form received  PA filled and fax back to [facility]   Confirmation received  Awaiting payor response

## 2024-02-26 RX ORDER — TOPIRAMATE 25 MG/1
25 TABLET ORAL NIGHTLY
Qty: 30 TABLET | Refills: 0 | Status: SHIPPED | OUTPATIENT
Start: 2024-02-26

## 2024-03-04 NOTE — TELEPHONE ENCOUNTER
Medication Quantity Refills Start End   escitalopram (LEXAPRO) 10 MG Oral Tab 90 tablet 4 3/16/2023 --   Sig:   Take 1 tablet (10 mg total) by mouth daily.     Route:   Oral

## 2024-03-05 RX ORDER — ESCITALOPRAM OXALATE 10 MG/1
10 TABLET ORAL DAILY
Qty: 90 TABLET | Refills: 4 | Status: SHIPPED | OUTPATIENT
Start: 2024-03-05

## 2024-03-18 DIAGNOSIS — E66.9 CLASS 2 OBESITY WITHOUT SERIOUS COMORBIDITY WITH BODY MASS INDEX (BMI) OF 39.0 TO 39.9 IN ADULT, UNSPECIFIED OBESITY TYPE: ICD-10-CM

## 2024-03-19 ENCOUNTER — HOSPITAL ENCOUNTER (OUTPATIENT)
Dept: GENERAL RADIOLOGY | Age: 54
Discharge: HOME | End: 2024-03-19
Attending: INTERNAL MEDICINE
Payer: COMMERCIAL

## 2024-03-19 ENCOUNTER — PATIENT MESSAGE (OUTPATIENT)
Dept: INTERNAL MEDICINE CLINIC | Facility: CLINIC | Age: 54
End: 2024-03-19

## 2024-03-19 DIAGNOSIS — R06.02 SOB (SHORTNESS OF BREATH): ICD-10-CM

## 2024-03-19 DIAGNOSIS — R06.02 SOB (SHORTNESS OF BREATH): Primary | ICD-10-CM

## 2024-03-19 DIAGNOSIS — E66.9 CLASS 2 OBESITY WITHOUT SERIOUS COMORBIDITY WITH BODY MASS INDEX (BMI) OF 39.0 TO 39.9 IN ADULT, UNSPECIFIED OBESITY TYPE: ICD-10-CM

## 2024-03-19 PROCEDURE — 71046 X-RAY EXAM CHEST 2 VIEWS: CPT | Performed by: INTERNAL MEDICINE

## 2024-03-19 RX ORDER — ALBUTEROL SULFATE 90 UG/1
1 AEROSOL, METERED RESPIRATORY (INHALATION) EVERY 6 HOURS PRN
Qty: 18 G | Refills: 0 | Status: SHIPPED | OUTPATIENT
Start: 2024-03-19

## 2024-03-19 RX ORDER — TIRZEPATIDE 2.5 MG/.5ML
2.5 INJECTION, SOLUTION SUBCUTANEOUS WEEKLY
Qty: 1 ML | Refills: 0 | Status: SHIPPED | OUTPATIENT
Start: 2024-03-19

## 2024-03-19 RX ORDER — TOPIRAMATE 25 MG/1
25 TABLET ORAL NIGHTLY
Qty: 30 TABLET | Refills: 0 | OUTPATIENT
Start: 2024-03-19

## 2024-03-19 NOTE — TELEPHONE ENCOUNTER
X-ray from [facility]     Procedure Note    Lesvia Coyne MD - 10/24/2019  Formatting of this note might be different from the original.        Portable chest    History: Shortness of breath.    Technique: Single frontal view of the chest    Comparison: None.    Findings: Heart and Mediastinum are normal. Lung fields are clear. There is no evidence of pneumothorax or pleural effusion. Bony structures are grossly intact.    IMPRESSION:  Impression: No acute intrathoracic abnormality  Exam End: 10/23/19 10:48 PM    Specimen Collected: 10/24/19  7:51 AM Last Resulted: 10/24/19  7:51 AM   Received From: [facility]  Result Received: 12/04/23  1:23 PM    View Encounter      CT from [facility]    IMPRESSION:  Impression:    1. No evidence of pulmonary embolus or aortic dissection.    2. Mild enlargement of the main pulmonary artery measuring up to 2.4 cm, suggesting pulmonary hypertension in the appropriate clinical setting.    3. Scattered groundglass opacities predominantly within the mid and lower lung zones likely related to reactive small airways disease or mild pulmonary edema.  Exam End: 10/23/19 10:57 PM    Specimen Collected: 10/24/19  7:22 AM Last Resulted: 10/24/19  7:24 AM   Received From: [facility]  Result Received: 12/04/23  1:23 PM    View Encounter

## 2024-03-19 NOTE — TELEPHONE ENCOUNTER
From: Sonja Hines  To: Cydney Que Josr  Sent: 3/19/2024 10:56 AM CDT  Subject: Chest X-ray results     Natalia Richards,  Thank you for quickly reading and letting me know the results. Back in 2019, I also had a chest X-ray at [facility]. Would it be useful for me to get a copy so we could do a comparison? I am still having labored breathing. Not sure what if any next steps make sense.   Thanks for your guidance,   Sonja

## 2024-03-28 ENCOUNTER — PATIENT MESSAGE (OUTPATIENT)
Dept: INTERNAL MEDICINE CLINIC | Facility: CLINIC | Age: 54
End: 2024-03-28

## 2024-03-28 DIAGNOSIS — M35.01 SJOGREN'S SYNDROME WITH KERATOCONJUNCTIVITIS SICCA (HCC): Primary | ICD-10-CM

## 2024-03-28 NOTE — TELEPHONE ENCOUNTER
Starting zepbound should have no bearing on the positive NITIN test.  As far as wanting instructions about how to take the zepboundshe can go ahead and set up a nurse visit  I will forward this to Dr. Kilgore for her input if necessary

## 2024-03-28 NOTE — TELEPHONE ENCOUNTER
From: Sonja Hines  To: Cydney Ovalles  Sent: 3/28/2024 6:36 AM CDT  Subject: Zepbound Sjogrens?    Hello,  Was at dermatologist office for chest rash. She ran an NITIN blood lab. Tried to fax results to your office and attached here. I am not sure if I’m reading the results indicating Sjögren Syndrome?  Also, picked up Zepbound. 1) am I supposed to book time with your nurse/PA to go over administration? 2) does potential Sjögren dx even make sense to start the Zepbound?    Thanks

## 2024-04-05 ENCOUNTER — NURSE ONLY (OUTPATIENT)
Dept: INTERNAL MEDICINE CLINIC | Facility: CLINIC | Age: 54
End: 2024-04-05
Payer: COMMERCIAL

## 2024-04-05 NOTE — PROGRESS NOTES
Patient arrived for nurse visit for [employer] teaching. Discussed with patient how to use, safety precautions, prepping the skin, administering and storing/disposing the medication. Demonstrated technique and patient administered herself the first dose. Patient tolerated well. Addressed all questions and discussed the importance of diet/exercise and lifestyle changes. Handout printed from [employer] and provided to patient for reference. Patient v/u to all.

## 2024-04-11 ENCOUNTER — TELEPHONE (OUTPATIENT)
Dept: INTERNAL MEDICINE CLINIC | Facility: CLINIC | Age: 54
End: 2024-04-11

## 2024-04-11 LAB
ALT: 19
ANACHOICE(R) SCREEN: POSITIVE
AST: 17
CALCIUM: 9.6
CREATININE: 0.62 MG/DL (ref 0.6–1.2)
DNA (DS) ANTIBODY: 1
GLUCOSE: 96
HEMOGLOBIN: 14.9 G/DL (ref 13–17)
PLATELET COUNT: 358
POTASSIUM: 4.2
SODIUM: 136
WHITE BLOOD CELL COUNT: 9.8

## 2024-04-19 ENCOUNTER — PATIENT MESSAGE (OUTPATIENT)
Dept: INTERNAL MEDICINE CLINIC | Facility: CLINIC | Age: 54
End: 2024-04-19

## 2024-04-19 DIAGNOSIS — E66.9 CLASS 2 OBESITY WITHOUT SERIOUS COMORBIDITY WITH BODY MASS INDEX (BMI) OF 39.0 TO 39.9 IN ADULT, UNSPECIFIED OBESITY TYPE: ICD-10-CM

## 2024-04-19 DIAGNOSIS — R06.83 SNORING: Primary | ICD-10-CM

## 2024-04-19 DIAGNOSIS — R53.83 OTHER FATIGUE: ICD-10-CM

## 2024-04-22 RX ORDER — TIRZEPATIDE 5 MG/.5ML
5 INJECTION, SOLUTION SUBCUTANEOUS WEEKLY
Qty: 2 ML | Refills: 2 | Status: SHIPPED | OUTPATIENT
Start: 2024-04-22 | End: 2024-05-14

## 2024-04-22 NOTE — TELEPHONE ENCOUNTER
From: Sonja Hines  To: Cydney Ovalles  Sent: 4/19/2024 12:13 PM CDT  Subject: Request Labs    Natalia Richards,  Thank you for your reminder about labs. Wondering since I’m already going to schedule that lab, if you would consider running labs for my fatigue. It has gotten worse over the last couple of months. Do you think we should check for Vitamin D, B and Iron? Or something else? I have been falling asleep at my desk and sometimes even while driving. And my commute is only 25 minutes! Let me know your thoughts or if I should come in to discuss.     Also, I am going to administer my 3rd, 2.5mg Zepbound today. Do you need to see me before my next refill?     Best regards,  Sonja

## 2024-04-22 NOTE — TELEPHONE ENCOUNTER
Yes she can follow-up with me.  In the meantime I do not want her to drive if she is feeling drowsy and I have ordered a sleep study for her as sleep apnea can often times cause drowsiness and fatigue.  I have ordered blood work for her as well.  Cydney Ovalles DO

## 2024-04-26 ENCOUNTER — RT VISIT (OUTPATIENT)
Dept: RESPIRATORY THERAPY | Facility: HOSPITAL | Age: 54
End: 2024-04-26
Attending: INTERNAL MEDICINE
Payer: COMMERCIAL

## 2024-04-26 DIAGNOSIS — R06.02 SOB (SHORTNESS OF BREATH): ICD-10-CM

## 2024-04-26 PROCEDURE — 94070 EVALUATION OF WHEEZING: CPT

## 2024-04-30 NOTE — PROCEDURES
Mannitol Challenge Test:     Interpretation:  Upon challenge with Mannitol Inhalation Cumulative dose of 635.000) at Provocation 8, FEV1 declined 2.35 L to 2.33 L or -8.8% from baseline. ( PD (-15) , FEV1 % decline greater than 15% is considered clinically significant) Classification of Airway Hyper-responsiveness (AHR) Mannitol (Cumulated Dose): Mild AHR : >155mg; Moderate AHR :> 35 mg and ? 155 mg; Marked AHR : ?  35 mg (Ref: from Eur Repir J 2018: 52: [id number].     Impression    This test is negative for hyper-responsiveness after challenge with mannitol inhalation. Further Clinical evaluation is recommended       Luis Alberto Almanza MD

## 2024-05-02 ENCOUNTER — OFFICE VISIT (OUTPATIENT)
Dept: OBGYN CLINIC | Facility: CLINIC | Age: 54
End: 2024-05-02
Payer: COMMERCIAL

## 2024-05-02 VITALS
WEIGHT: 222.25 LBS | SYSTOLIC BLOOD PRESSURE: 118 MMHG | HEIGHT: 63 IN | BODY MASS INDEX: 39.38 KG/M2 | DIASTOLIC BLOOD PRESSURE: 80 MMHG

## 2024-05-02 DIAGNOSIS — Z01.419 ENCOUNTER FOR WELL WOMAN EXAM WITH ROUTINE GYNECOLOGICAL EXAM: Primary | ICD-10-CM

## 2024-05-02 DIAGNOSIS — N95.1 VASOMOTOR SYMPTOMS DUE TO MENOPAUSE: ICD-10-CM

## 2024-05-02 DIAGNOSIS — Z12.11 SCREENING FOR COLON CANCER: ICD-10-CM

## 2024-05-02 DIAGNOSIS — Z12.31 ENCOUNTER FOR SCREENING MAMMOGRAM FOR BREAST CANCER: ICD-10-CM

## 2024-05-02 PROBLEM — S06.0X0A CONCUSSION WITH NO LOSS OF CONSCIOUSNESS: Status: ACTIVE | Noted: 2018-11-26

## 2024-05-02 PROBLEM — E03.9 ACQUIRED HYPOTHYROIDISM: Status: ACTIVE | Noted: 2020-03-03

## 2024-05-02 PROBLEM — R00.2 PALPITATIONS: Status: ACTIVE | Noted: 2020-12-08

## 2024-05-02 PROBLEM — G44.309 POST-CONCUSSION HEADACHE: Status: ACTIVE | Noted: 2018-11-26

## 2024-05-02 PROBLEM — H53.30 DISORDER OF BINOCULAR VISION: Status: ACTIVE | Noted: 2018-12-11

## 2024-05-02 PROBLEM — E55.9 VITAMIN D DEFICIENCY: Status: ACTIVE | Noted: 2018-02-20

## 2024-05-02 PROBLEM — S16.1XXA CERVICAL STRAIN, ACUTE, INITIAL ENCOUNTER: Status: ACTIVE | Noted: 2018-11-26

## 2024-05-02 PROBLEM — R07.2 PRECORDIAL PAIN: Status: ACTIVE | Noted: 2020-03-03

## 2024-05-02 PROBLEM — E78.01 FAMILIAL HYPERCHOLESTEROLEMIA: Status: ACTIVE | Noted: 2022-05-26

## 2024-05-02 PROBLEM — S39.012A LUMBAR STRAIN, INITIAL ENCOUNTER: Status: ACTIVE | Noted: 2018-11-26

## 2024-05-02 PROBLEM — I47.19 ATRIAL TACHYCARDIA (HCC): Status: ACTIVE | Noted: 2020-12-08

## 2024-05-02 PROBLEM — E05.00 GRAVES DISEASE: Status: ACTIVE | Noted: 2021-11-23

## 2024-05-02 PROCEDURE — 3074F SYST BP LT 130 MM HG: CPT | Performed by: OBSTETRICS & GYNECOLOGY

## 2024-05-02 PROCEDURE — 3079F DIAST BP 80-89 MM HG: CPT | Performed by: OBSTETRICS & GYNECOLOGY

## 2024-05-02 PROCEDURE — 3008F BODY MASS INDEX DOCD: CPT | Performed by: OBSTETRICS & GYNECOLOGY

## 2024-05-02 PROCEDURE — 99396 PREV VISIT EST AGE 40-64: CPT | Performed by: OBSTETRICS & GYNECOLOGY

## 2024-05-02 RX ORDER — ESTRADIOL 0.75 MG/.75G
1 GEL TOPICAL DAILY
Qty: 21 EACH | Refills: 0 | COMMUNITY
Start: 2024-05-02

## 2024-05-02 NOTE — PROGRESS NOTES
[facility]  Obstetrics and Gynecology  Gynecology Visit    Chief Complaint   Patient presents with    Annual           Sonja Hines is a 53 year old female who presents for Annual exam.    LMP: Hysterectomy , has ovaries.    Menses regular: n/a.    Menstrual flow normal: n/a.    Birth control or HRT:  estradiol gel.   Refill no  Last Pap Smear: n/a.  Any history of abnormal paps: n/a   Last MM23- pcp orders  Any Medication Refills needed today?: unsure  Sleep: 5 hrs.    Diet: Balanced.    Exercise: No.   Screening labs/Blood work today: No.     Colonoscopy (if over 46 y/o): 3/8/21. Order placed  Gardasil:(age 9-46 y/o) no.   Genetic Cancer screen (if indicated): No.   Flu (Aug-April): 23.TDAP (every 10 years) 3/3/21.      Additional Problems/concerns: Patient would like to discuss the estradiol gel, pt reports recently tested for and seeing rheumatologist for assessment for sjogren's syndrome    Patient reports increasing hot flashes.    Next Appt: scheduled    Immunization History   Administered Date(s) Administered    Covid-19 Vaccine Agolo (J&J) 0.5ml 2021    Covid-19 Vaccine Moderna 100 mcg/0.5 ml 02/10/2022    FLULAVAL 6 months & older 0.5 ml Prefilled syringe (99266) 11/15/2022    FLUZONE 6 months and older PFS 0.5 ml (09942) 2020, 2023    Influenza 2021    TDAP 2021    Zoster Vaccine Recombinant Adjuvanted (Shingrix) 2024         Current Outpatient Medications:     Tirzepatide-Weight Management (ZEPBOUND) 5 MG/0.5ML Subcutaneous Solution Auto-injector, Inject 5 mg into the skin once a week for 4 doses. (Patient taking differently: Inject 2.5 mg into the skin once a week.), Disp: 2 mL, Rfl: 2    albuterol 108 (90 Base) MCG/ACT Inhalation Aero Soln, Inhale 1 puff into the lungs every 6 (six) hours as needed., Disp: 18 g, Rfl: 0    ESCITALOPRAM 10 MG Oral Tab, TAKE 1 TABLET(10 MG) BY MOUTH DAILY, Disp: 90 tablet, Rfl: 4    rosuvastatin 5 MG Oral Tab,  Take 1 tablet (5 mg total) by mouth daily., Disp: , Rfl:     cetirizine 10 MG Oral Tab, Take 1 tablet (10 mg total) by mouth daily., Disp: , Rfl:     Estradiol 0.5 MG/0.5GM Transdermal Gel, APPLY 1 DOSE TO THE AFFECTED AREA EVERY EVENING, Disp: 90 each, Rfl: 3    ergocalciferol 1.25 MG (83563 UT) Oral Cap, , Disp: , Rfl:     metoprolol succinate ER 25 MG Oral Tablet 24 Hr, Take 1 tablet (25 mg total) by mouth daily. Half of 25mg, Disp: , Rfl:     SYNTHROID 125 MCG Oral Tab, , Disp: , Rfl:     B Complex-C-Folic Acid (B-COMPLEX/VITAMIN C OR), Take 1 tablet by mouth daily., Disp: , Rfl:     Allergies   Allergen Reactions    Azithromycin HIVES    Erythromycin OTHER (SEE COMMENTS) and UNKNOWN    Penicillins HIVES and OTHER (SEE COMMENTS)    Sulfa Antibiotics HIVES    Amoxicillin DIZZINESS    Milk Protein Extract OTHER (SEE COMMENTS)       OB History    Para Term  AB Living   2 2 2 0 0 0   SAB IAB Ectopic Multiple Live Births   0 0 0 0 0      # Outcome Date GA Lbr Efren/2nd Weight Sex Type Anes PTL Lv   2 Term /10/00    M Vag-Spont None     1 Term 96    M Vag-Spont EPI         Past Medical History:    Allergic rhinitis    Anxiety    Not formally diagnosed    Graves disease    Hyperthyroidism    Graves disease. Had AJ now on Synthoid    Hypothyroidism    After AJ    Sleep apnea       Past Surgical History:   Procedure Laterality Date    D & c      Hysterectomy  2018    partial hystero.      ,     Skin surgery      Tonsillectomy         Family History   Problem Relation Age of Onset    Heart Disorder Father     Diabetes Father     Hypertension Father     Heart Disease Father     Heart Disorder Mother     Hypertension Mother     Heart Disease Mother     Hypertension Sister         Tobacco  Allergies  Med Hx  Surg Hx  Fam Hx  Soc Hx        Social History     Socioeconomic History    Marital status:      Spouse name: Not on file    Number of children: Not on file    Years of  education: Not on file    Highest education level: Not on file   Occupational History    Not on file   Tobacco Use    Smoking status: Never     Passive exposure: Never    Smokeless tobacco: Never   Vaping Use    Vaping status: Never Used   Substance and Sexual Activity    Alcohol use: Yes     Comment: Occasionally when out to dinner with friends    Drug use: Not Currently     Types: Cannabis     Comment: Haven’t tried or used in well over a year. Tried a gummy    Sexual activity: Yes     Partners: Male   Other Topics Concern    Caffeine Concern No    Exercise Yes     Comment: 1-3x per week    Seat Belt Yes    Special Diet Yes     Comment: Avoid dairy, lactose intolerance    Stress Concern No    Weight Concern Yes   Social History Narrative    Not on file     Social Determinants of Health     Financial Resource Strain: Not on file   Food Insecurity: Not on file   Transportation Needs: Not on file   Physical Activity: Sufficiently Active (12/8/2020)    [facility]    Exercise Vital Sign     Days of Exercise per Week: 7 days     Minutes of Exercise per Session: 60 min   Stress: Not on file   Social Connections: Unknown (3/10/2021)    [facility] from [facility], [facility]    Social Connections     Conversations with friends/family/neighbors per week: Not on file   Housing Stability: Low Risk  (7/7/2021)    Received from [facility], [facility]    Housing Stability     Mortgage Payment Concerns?: Not on file     Number of Places Lived in the Last Year: Not on file     Unstable Housing?: Not on file         /80   Ht 5' 3\" (1.6 m)   Wt 222 lb 3.6 oz (100.8 kg)   Wt Readings from Last 3 Encounters:   05/02/24 222 lb 3.6 oz (100.8 kg)   02/08/24 226 lb 6.4 oz (102.7 kg)   11/04/23 217 lb 12.8 oz (98.8 kg)     Health Maintenance   Topic Date Due    COVID-19 Vaccine (3 - 2023-24 season)  09/01/2023    Zoster Vaccines (2 of 2) 04/04/2024    Colorectal Cancer Screening  09/09/2024 (Originally 8/27/1970)    Annual Physical  09/28/2024    Mammogram  12/12/2024    DTaP,Tdap,and Td Vaccines (2 - Td or Tdap) 03/03/2031    Influenza Vaccine  Completed    Annual Depression Screening  Completed    Pneumococcal Vaccine: Birth to 64yrs  Aged Out           Review of Systems   General: Present- Feeling well. Not Present- Chills, Fever, Weight Gain and Weight Loss.  HEENT: Not Present- Headache and Sore Throat.  Respiratory: Not Present- Cough, Difficulty Breathing, Hemoptysis and Sputum Production.  Breast: Not Present- Breast Mass, Breast Pain and Nipple Discharge.  Cardiovascular: Not Present- Chest Pain, Elevated Blood Pressure, Fainting / Blacking Out and Shortness of Breath.  Gastrointestinal: Not Present- Bloody Stool, Change in Bowel Habits, Constipation, Diarrhea, Heartburn, Nausea, Bloating and Vomiting.  Female Genitourinary: Not Present- Discharge, Dysuria, Frequency, Incontinence, Pelvic Pain, Urgency, Urinating at Night, Vaginal Bleeding, Vaginal Discharge, Vaginal dryness and Vaginal itching/burning.  Musculoskeletal: Not Present- Leg Cramps and Swelling of Extremities.  Neurological: Not Present- Dizziness and Headaches.  Psychiatric: Not Present- Anxiety and Depression.  Endocrine: Not Present- Appetite Changes, Hair Changes and Thyroid Problems.  Hematology: Not Present- Blood Clots, Easy Bruising and Excessive bleeding.  All other systems negative       Physical Exam   The physical exam findings are as follows:       General   Mental Status - Alert. General Appearance - Cooperative. Orientation - Oriented X4. Build & Nutrition - Well nourished. Central Obesity       Head and Neck  Thyroid   Gland Characteristics - normal size and consistency.    Chest and Lung Exam   Inspection:   Chest Wall: - Normal.  Palpation: - Palpation normal.  Auscultation:   Breath sounds: - Normal.  Adventitious  sounds: - No Adventitious sounds.    Breast   Nipples: Characteristics - Bilateral - Normal. Discharge - Bilateral - None.  Breast - Bilateral - Symmetric.       Cardiovascular   Auscultation: Rhythm - Regular. Heart Sounds - Normal heart sounds.  Murmurs & Other Heart Sounds: Auscultation of the heart reveals - No Murmurs.      Abdomen   Inspection: Inspection of the abdomen reveals - No Hernias. Incisional scars - C/s  incisional scars.  Palpation/Percussion: Palpation and Percussion of the abdomen reveal - Non Tender and No Palpable abdominal masses.  Liver: - Normal.      Female Genitourinary     External Genitalia   Perineum - Normal. Bartholin's Gland - Bilateral - Normal. Clitoris - Normal.  Introitus: Characteristics - No Cystocele, Enterocele or Rectocele. Discharge - None.  Labia Majora: Lesions - Bilateral - None. Characteristics - Bilateral - Normal.  Urethra: Characteristics - Normal. Discharge - None.  [place] Gland - Bilateral - Normal.  Vulva: Lesions - None.    Speculum & Bimanual   Vagina:   Vaginal Wall: - Normal.  Vaginal Lesions - None. Vaginal Mucosa - Normal.  Cervix: Characteristics - No Motion tenderness. Discharge - None.  Uterus: Characteristics - Normal. Position - Midposition.  Adnexa: Characteristics - Bilateral - Normal. Masses - No Adnexal Masses.  Wet [place]: Main patient complaints - None.     Rectal   Anorectal Exam: External - normal external exam.    Peripheral Vascular   Upper Extremity:   Palpation: - Pulses bilaterally normal.  Lower Extremity: Inspection - Bilateral - Inspection Normal.  Palpation: Edema - Bilateral - No edema.    Neurologic   Mental Status: - Normal.    Lymphatic  General Lymphatics   Description - Normal .    1. Encounter for well woman exam with routine gynecological exam    2. Encounter for screening mammogram for breast cancer    3. Screening for colon cancer    4. Vasomotor symptoms due to menopause        Discussed diet and physical activity  recommendations    Patient has mammogram scheduled. Discussed screening recommendations    Discussed colon CA screening: cologuard order placed    Vasomotor symptoms: discussed lifestyle modifications and increasing estradiol dose to .75mg. Menopause Handout given    RTC 1 year or PRN for gynecological concerns    Assessment and Plan completed by Rachel ACKERMAN under the direct supervision of Dr. Sam      Patient seen and examined, Agree with assessment and plan of care documented by NP student.     Xiomara Sam MD

## 2024-05-03 ENCOUNTER — PATIENT MESSAGE (OUTPATIENT)
Dept: INTERNAL MEDICINE CLINIC | Facility: CLINIC | Age: 54
End: 2024-05-03

## 2024-05-06 RX ORDER — TIRZEPATIDE 2.5 MG/.5ML
2.5 INJECTION, SOLUTION SUBCUTANEOUS WEEKLY
Qty: 2 ML | Refills: 3 | Status: SHIPPED | OUTPATIENT
Start: 2024-05-06

## 2024-05-06 NOTE — TELEPHONE ENCOUNTER
From: Sonja Hines  To: Cydney Que Holleyma  Sent: 5/3/2024 1:43 PM CDT  Subject: Zepbound 2.5 for another month     Hello Doc,  The pharmacy did not have Zepbound 5.0 in stock. I ordered it over a week ago. I filled the 2nd box of 2.5. I’ve lost 8# since the start on April 5th. I think that is a great trend for me. Quite frankly the 2.5 still has been helping quiet the food noise. It gives me the moment to pause before taking a bite of a food I previously might have binged on. I am happy to stay at 2.5 until the food starts calling me again.   What are your thoughts?  I will be taking my blood work within the next couple of days. I had taken some biotin and wanted to make sure it was out of my system before I had the labs drawn.   Thanks,  Sonja

## 2024-05-11 LAB
% SATURATION: 32 % (CALC) (ref 16–45)
IRON BINDING CAPACITY: 265 MCG/DL (CALC) (ref 250–450)
IRON, TOTAL: 85 MCG/DL (ref 45–160)
TSH: 1.35 MIU/L
VITAMIN B12: 935 PG/ML (ref 200–1100)
VITAMIN D, 25-OH, TOTAL: 62 NG/ML (ref 30–100)

## 2024-05-14 ENCOUNTER — OFFICE VISIT (OUTPATIENT)
Dept: INTERNAL MEDICINE CLINIC | Facility: CLINIC | Age: 54
End: 2024-05-14

## 2024-05-14 VITALS
SYSTOLIC BLOOD PRESSURE: 122 MMHG | TEMPERATURE: 98 F | RESPIRATION RATE: 18 BRPM | WEIGHT: 219 LBS | BODY MASS INDEX: 38.8 KG/M2 | DIASTOLIC BLOOD PRESSURE: 82 MMHG | HEART RATE: 78 BPM | OXYGEN SATURATION: 96 % | HEIGHT: 63 IN

## 2024-05-14 DIAGNOSIS — M35.01 SJOGREN'S SYNDROME WITH KERATOCONJUNCTIVITIS SICCA (HCC): ICD-10-CM

## 2024-05-14 DIAGNOSIS — E89.0 POST-OPERATIVE HYPOTHYROIDISM: ICD-10-CM

## 2024-05-14 DIAGNOSIS — E66.9 CLASS 2 OBESITY WITHOUT SERIOUS COMORBIDITY WITH BODY MASS INDEX (BMI) OF 39.0 TO 39.9 IN ADULT, UNSPECIFIED OBESITY TYPE: Primary | ICD-10-CM

## 2024-05-14 DIAGNOSIS — F41.9 ANXIETY: ICD-10-CM

## 2024-05-14 DIAGNOSIS — R53.83 OTHER FATIGUE: ICD-10-CM

## 2024-05-14 DIAGNOSIS — G47.33 OSA ON CPAP: ICD-10-CM

## 2024-05-14 PROBLEM — E05.00 GRAVES DISEASE: Status: RESOLVED | Noted: 2021-11-23 | Resolved: 2024-05-14

## 2024-05-14 PROBLEM — E03.9 ACQUIRED HYPOTHYROIDISM: Status: RESOLVED | Noted: 2020-03-03 | Resolved: 2024-05-14

## 2024-05-14 PROBLEM — E55.9 VITAMIN D DEFICIENCY: Status: RESOLVED | Noted: 2018-02-20 | Resolved: 2024-05-14

## 2024-05-14 PROBLEM — S16.1XXA CERVICAL STRAIN, ACUTE, INITIAL ENCOUNTER: Status: RESOLVED | Noted: 2018-11-26 | Resolved: 2024-05-14

## 2024-05-14 PROBLEM — G44.309 POST-CONCUSSION HEADACHE: Status: RESOLVED | Noted: 2018-11-26 | Resolved: 2024-05-14

## 2024-05-14 PROBLEM — E78.2 MIXED HYPERLIPIDEMIA: Status: RESOLVED | Noted: 2023-09-28 | Resolved: 2024-05-14

## 2024-05-14 PROBLEM — S39.012A LUMBAR STRAIN, INITIAL ENCOUNTER: Status: RESOLVED | Noted: 2018-11-26 | Resolved: 2024-05-14

## 2024-05-14 PROBLEM — R07.2 PRECORDIAL PAIN: Status: RESOLVED | Noted: 2020-03-03 | Resolved: 2024-05-14

## 2024-05-14 PROBLEM — S06.0X0A CONCUSSION WITH NO LOSS OF CONSCIOUSNESS: Status: RESOLVED | Noted: 2018-11-26 | Resolved: 2024-05-14

## 2024-05-14 PROBLEM — R00.2 PALPITATIONS: Status: RESOLVED | Noted: 2020-12-08 | Resolved: 2024-05-14

## 2024-05-14 PROBLEM — H53.30 DISORDER OF BINOCULAR VISION: Status: RESOLVED | Noted: 2018-12-11 | Resolved: 2024-05-14

## 2024-05-14 PROBLEM — R06.02 SOB (SHORTNESS OF BREATH): Status: RESOLVED | Noted: 2024-04-26 | Resolved: 2024-05-14

## 2024-05-14 PROCEDURE — 3074F SYST BP LT 130 MM HG: CPT | Performed by: INTERNAL MEDICINE

## 2024-05-14 PROCEDURE — 90471 IMMUNIZATION ADMIN: CPT | Performed by: INTERNAL MEDICINE

## 2024-05-14 PROCEDURE — 3008F BODY MASS INDEX DOCD: CPT | Performed by: INTERNAL MEDICINE

## 2024-05-14 PROCEDURE — 3079F DIAST BP 80-89 MM HG: CPT | Performed by: INTERNAL MEDICINE

## 2024-05-14 PROCEDURE — 90750 HZV VACC RECOMBINANT IM: CPT | Performed by: INTERNAL MEDICINE

## 2024-05-14 PROCEDURE — 99214 OFFICE O/P EST MOD 30 MIN: CPT | Performed by: INTERNAL MEDICINE

## 2024-05-14 RX ORDER — TIRZEPATIDE 2.5 MG/.5ML
2.5 INJECTION, SOLUTION SUBCUTANEOUS WEEKLY
Qty: 2 ML | Refills: 3 | Status: SHIPPED | OUTPATIENT
Start: 2024-05-14

## 2024-05-14 NOTE — PATIENT INSTRUCTIONS
You received the second shingles vaccine today.    Continue the zepbound at 2.5 mg. Great job on the weight loss    See me back after you follow up with Dr. Tatum

## 2024-05-14 NOTE — PROGRESS NOTES
Patient Office Visit    ASSESSMENT AND PLAN:   1. Class 2 obesity without serious comorbidity with body mass index (BMI) of 39.0 to 39.9 in adult, unspecified obesity type  Note: Applauded on the weight loss.  Start weight was 226 pounds.  Will continue current medicine at the current dose and continue follow-up with the dietitian.  - Tirzepatide-Weight Management (ZEPBOUND) 2.5 MG/0.5ML Subcutaneous Solution Auto-injector; Inject 2.5 mg into the skin once a week.  Dispense: 2 mL; Refill: 3    2. Post-operative hypothyroidism  Note: Continue current Synthroid and follow-up with endocrinologist    3. JEOVANY on CPAP  Note: Continue compliance with the CPAP machine.  Fatigue has not improved but snoring has since being on the machine    4. Anxiety  Note: Continue Lexapro    5. Sjogren's syndrome with keratoconjunctivitis sicca (HCC)  Note: Has an appointment with the rheumatologist coming up    6.  Other fatigue  Note: Workup has been negative.  She recently saw her gynecologist who recommended to increase the estradiol dose.  Patient will continue the current dose for now and will monitor her symptoms.  Will monitor her symptoms.  Continue to exercise regularly and wearing CPAP machine regularly    Second shingles vaccine provided today    Return to clinic in 3 months      Patient/Caregiver Education: Patient/Caregiver Education: There are no barriers to learning. Medical education done. Outcome: Patient verbalizes understanding. Patient is notified to call with any questions, complications, allergies, or worsening or changing symptoms.  Patient is to call with any side effects or complications from the treatments as a result of today.      Reviewed Past Medical History and   Patient Active Problem List   Diagnosis    History of hysterectomy    History of Graves' disease    Family history of heart disease    Anxiety    Class 2 obesity without serious comorbidity with body mass index (BMI) of 39.0 to 39.9 in adult    JEOVANY  on CPAP    Post-operative hypothyroidism    Atrial tachycardia (HCC)    Familial hypercholesterolemia    Sjogren's syndrome with keratoconjunctivitis sicca (HCC)       Orders Placed This Encounter   Procedures    Zoster Recombinant Adjuvanted (Shingrix -Shingles) [[id number]]     Requested Prescriptions     Signed Prescriptions Disp Refills    Tirzepatide-Weight Management (ZEPBOUND) 2.5 MG/0.5ML Subcutaneous Solution Auto-injector 2 mL 3     Sig: Inject 2.5 mg into the skin once a week.         Cydney Ovalles,   CC:  Chief Complaint   Patient presents with    Follow - Up         HPI:   Sonja Hines is a 53-year-old female who presents for follow-up    Obesity: Has been doing really well on zepbound.  She is following up with a dietitian through her work as well as talking to a pharmacist.  She is going to the gym regularly as well.  She would like to continue the current dose of the medicine as it has been helping but not causing significant weight loss very suddenly.  Hypothyroidism: She had her thyroid gland completely removed and has been following up with endocrinologist at [facility]  Fatigue: Some days she is feeling well and other days she feels very exhausted.  She just saw her gynecologist and was recommended to increase her estradiol however she will continue the smallest dose just to see how she feels after the weather improves.  She has an appointment with the rheumatologist coming up but it is not for a few months.  She does have dry mouth and dry eyes and occasional joint pains but no other symptoms  Sleep apnea: Has been tolerating the CPAP machine and she is not really sure if the fatigue has improved but her snoring    Past Medical History:    Allergic rhinitis    Anxiety    Not formally diagnosed    Graves disease    Hyperthyroidism    Graves disease. Had AJ now on Synthoid    Hypothyroidism    After AJ    Sleep apnea       Past Surgical History:   Procedure Laterality Date    D & c       Hysterectomy  2018    partial hystero.      ,     Skin surgery      Tonsillectomy         Social History:  Social History     Socioeconomic History    Marital status:    Tobacco Use    Smoking status: Never     Passive exposure: Never    Smokeless tobacco: Never   Vaping Use    Vaping status: Never Used   Substance and Sexual Activity    Alcohol use: Yes     Comment: Occasionally when out to dinner with friends    Drug use: Not Currently     Types: Cannabis     Comment: Haven’t tried or used in well over a year. Tried a gummy    Sexual activity: Yes     Partners: Male   Other Topics Concern    Caffeine Concern No    Exercise Yes     Comment: 1-3x per week    Seat Belt Yes    Special Diet Yes     Comment: Avoid dairy, lactose intolerance    Stress Concern No    Weight Concern Yes     Social Determinants of Health     Physical Activity: Sufficiently Active (2020)    Received from [facility] [facility]    Exercise Vital Sign     Days of Exercise per Week: 7 days     Minutes of Exercise per Session: 60 min    Received from [facility], [facility]    Social Connections    Received from [facility], [facility]    Housing Stability     Family History:  Family History   Problem Relation Age of Onset    Heart Disorder Father     Diabetes Father     Hypertension Father     Heart Disease Father     Heart Disorder Mother     Hypertension Mother     Heart Disease Mother     Hypertension Sister      Allergies:  Allergies   Allergen Reactions    Azithromycin HIVES    Erythromycin OTHER (SEE COMMENTS) and UNKNOWN    Penicillins HIVES and OTHER (SEE COMMENTS)    Sulfa Antibiotics HIVES    Amoxicillin DIZZINESS    Milk Protein Extract OTHER (SEE COMMENTS)     Current Meds:  Current Outpatient Medications on File Prior to Visit   Medication Sig Dispense Refill    Estradiol (DIVIGEL) 0.75 MG/0.75GM Transdermal  Gel Place 1 packet onto the skin daily. 21 each 0    albuterol 108 (90 Base) MCG/ACT Inhalation Aero Soln Inhale 1 puff into the lungs every 6 (six) hours as needed. 18 g 0    ESCITALOPRAM 10 MG Oral Tab TAKE 1 TABLET(10 MG) BY MOUTH DAILY 90 tablet 4    rosuvastatin 5 MG Oral Tab Take 1 tablet (5 mg total) by mouth daily.      cetirizine 10 MG Oral Tab Take 1 tablet (10 mg total) by mouth daily.      Estradiol 0.5 MG/0.5GM Transdermal Gel APPLY 1 DOSE TO THE AFFECTED AREA EVERY EVENING 90 each 3    ergocalciferol 1.25 MG (97341 UT) Oral Cap       metoprolol succinate ER 25 MG Oral Tablet 24 Hr Take 1 tablet (25 mg total) by mouth daily. Half of 25mg      SYNTHROID 125 MCG Oral Tab       B Complex-C-Folic Acid (B-COMPLEX/VITAMIN C OR) Take 1 tablet by mouth daily.       No current facility-administered medications on file prior to visit.         REVIEW OF SYSTEMS   Constitutional: no fatigue normal energy no weight changes   HENT: normal sinuses and no mouth issues   Eyes: . normal vision no eye pain   Respiratory: normal respirations no cough   Cardiovascular: no CP, or palpitations   Gastrointestinal: normal bowels and no abd pains   Genitourinary:  normal urination no hematuria, no frequency   Musculoskeletal: no pains in arms/legs, normal range of motion   Skin: no rashes or skin lesions that are new   Neurological:  no weakness, no numbness, normal gait   Hematological:  no bruises or bleeding   Psychiatric/Behavioral: normal mood no anxiety normal behavior     /82 (BP Location: Right arm, Patient Position: Sitting, Cuff Size: adult)   Pulse 78   Temp 98 °F (36.7 °C) (Temporal)   Resp 18   Ht 5' 3\" (1.6 m)   Wt 219 lb (99.3 kg)   SpO2 96%   BMI 38.79 kg/m²     PHYSICAL EXAM:   Constitutional: Vital signs reviewed as noted, well developed, in no acute distress.   HENT: NCAT  Eyes: pupils reactive bilaterally  Cardiovascular: nl s1 s2 no m/r/g  Pulmonary/Chest: CTA bilaterally with no  wheezes  Extremities: no pedal edema   Neurological:  no weakness in UE and LE, reflexes are normal  Skin: no rashes or bruises on visualized skin, not undressed   Psychiatric:normal mood

## 2024-05-28 ENCOUNTER — TELEPHONE (OUTPATIENT)
Dept: OBGYN CLINIC | Facility: CLINIC | Age: 54
End: 2024-05-28

## 2024-05-28 NOTE — TELEPHONE ENCOUNTER
Called patient, no answer. Voicemail left to inform patient that  received and reviewed her Cologuard results they are negative. Repeat in 3 years.  Advised to give the office a call with any questions/concerns.

## 2024-05-31 ENCOUNTER — TELEPHONE (OUTPATIENT)
Dept: INTERNAL MEDICINE CLINIC | Facility: CLINIC | Age: 54
End: 2024-05-31

## 2024-05-31 NOTE — TELEPHONE ENCOUNTER
Jenn with [facility] called to request diagnosis code and BMI for   Tirzepatide-Weight Management (ZEPBOUND) 2.5 MG/0.5ML Subcutaneous Solution Auto-injector to get approval from insurance.  Informed the diagnosis and BMI from last office visit for this medication.

## 2024-06-14 ENCOUNTER — HOSPITAL ENCOUNTER (OUTPATIENT)
Dept: ULTRASOUND IMAGING | Age: 54
Discharge: HOME | End: 2024-06-14
Attending: INTERNAL MEDICINE

## 2024-06-14 DIAGNOSIS — N63.20 MASS OF LEFT BREAST, UNSPECIFIED QUADRANT: Primary | ICD-10-CM

## 2024-06-14 DIAGNOSIS — N63.20 MASS OF LEFT BREAST, UNSPECIFIED QUADRANT: ICD-10-CM

## 2024-06-14 PROCEDURE — 76642 ULTRASOUND BREAST LIMITED: CPT | Performed by: INTERNAL MEDICINE

## 2024-07-30 ENCOUNTER — OFFICE VISIT (OUTPATIENT)
Dept: RHEUMATOLOGY | Facility: CLINIC | Age: 54
End: 2024-07-30
Payer: COMMERCIAL

## 2024-07-30 VITALS
OXYGEN SATURATION: 95 % | BODY MASS INDEX: 38 KG/M2 | RESPIRATION RATE: 16 BRPM | HEART RATE: 75 BPM | SYSTOLIC BLOOD PRESSURE: 118 MMHG | TEMPERATURE: 98 F | DIASTOLIC BLOOD PRESSURE: 82 MMHG | WEIGHT: 214 LBS

## 2024-07-30 DIAGNOSIS — R76.8 POSITIVE ANA (ANTINUCLEAR ANTIBODY): ICD-10-CM

## 2024-07-30 DIAGNOSIS — R68.2 DRY MOUTH: ICD-10-CM

## 2024-07-30 DIAGNOSIS — M35.9 UNDIFFERENTIATED CONNECTIVE TISSUE DISEASE (HCC): Primary | ICD-10-CM

## 2024-07-30 DIAGNOSIS — B36.9 FUNGAL RASH OF TRUNK: ICD-10-CM

## 2024-07-30 DIAGNOSIS — H04.129 DRY EYE: ICD-10-CM

## 2024-07-30 DIAGNOSIS — K12.1 ORAL ULCER: ICD-10-CM

## 2024-07-30 DIAGNOSIS — R76.8 SS-A ANTIBODY POSITIVE: ICD-10-CM

## 2024-07-30 PROCEDURE — 99244 OFF/OP CNSLTJ NEW/EST MOD 40: CPT | Performed by: INTERNAL MEDICINE

## 2024-07-30 PROCEDURE — 3079F DIAST BP 80-89 MM HG: CPT | Performed by: INTERNAL MEDICINE

## 2024-07-30 PROCEDURE — 3074F SYST BP LT 130 MM HG: CPT | Performed by: INTERNAL MEDICINE

## 2024-07-30 RX ORDER — KETOCONAZOLE 20 MG/G
1 CREAM TOPICAL 2 TIMES DAILY
Qty: 30 G | Refills: 2 | Status: SHIPPED | OUTPATIENT
Start: 2024-07-30

## 2024-07-30 NOTE — PROGRESS NOTES
Rheumatology New Patient Note  =====================================================================================================    Date of visit: 7/30/2024  ?  Chief complaint: +SSA antibody  Chief Complaint   Patient presents with    Consult     Pt is in to establish care with Rheumatologist for positive sjogrens test; test was between March and May 2024. Dermatologist referred pt to clinic today. Does experience some joint pain and occasionally does experience coldness in fingers and toes. Does have some discoloration on bicep of left arm. Unknown family history of possible rheumatological illnesses.      Referring (will send letter)  Dr. Yessi Marroquin (derm)    PCP  Cydney Ovalles DO  Fax: [phone]  Phone: [phone]    =====================================================================================================  HPI    Sonja Hines is a 53 year old female     Here for further evaluation of a positive SSA antibody.  Rash on the chest that developed in 2023. Tried over-the-counter antifungal cream without effect.  -dermatologist: Dr. Marroquin. Tried multiple steroid creams including TAC.  -rash has actually worsened.   -patient and sister with GRAVES disease.  Patient s/p AJ. On Synthyroid now.   -dry eye since ELLE/exophthalmos since Graves disease. Dry eye a bit worse recently  -dry mouth: worsening and more fillings and teeth cracking now per dentist  -Unclear if photosensitive rash and fatigue.  Felt very fatigued on the [place] holiday last year  -oral ulcers x 7 days. One today.  Very intermittent    Denies any new areas of joint pain/swelling  Denies any new lymphadenopathy  Denies any new peripheral neuropathy  Denies any definitive photosensitivity  Denies any significant fatigue or night sweats or unexpected weight loss.     Denies current alopecia, malar rash, discoid lesions, oral/nasal ulcers, pleuritic chest pain, arthritis, seizures/psychosis, Raynaud's, dry eyes/mouth, or  blood clots.    Denies miscarriages or obstetric events (early pre-eclampsia, IUGR, placental insufficiency)  -Prior pregnancies and/or children: 2 children  --Pregnancy complications: none  --no miscarriages      14 point ROS negative except noted above    Medications:  Current Outpatient Medications on File Prior to Visit   Medication Sig Dispense Refill    Tirzepatide-Weight Management (ZEPBOUND) 2.5 MG/0.5ML Subcutaneous Solution Auto-injector Inject 2.5 mg into the skin once a week. 2 mL 3    Estradiol (DIVIGEL) 0.75 MG/0.75GM Transdermal Gel Place 1 packet onto the skin daily. 21 each 0    albuterol 108 (90 Base) MCG/ACT Inhalation Aero Soln Inhale 1 puff into the lungs every 6 (six) hours as needed. 18 g 0    ESCITALOPRAM 10 MG Oral Tab TAKE 1 TABLET(10 MG) BY MOUTH DAILY 90 tablet 4    rosuvastatin 5 MG Oral Tab Take 1 tablet (5 mg total) by mouth every other day.      Estradiol 0.5 MG/0.5GM Transdermal Gel APPLY 1 DOSE TO THE AFFECTED AREA EVERY EVENING 90 each 3    ergocalciferol 1.25 MG (72961 UT) Oral Cap       metoprolol succinate ER 25 MG Oral Tablet 24 Hr Take 0.5 tablets (12.5 mg total) by mouth daily. Half of 25mg      SYNTHROID 125 MCG Oral Tab        No current facility-administered medications on file prior to visit.       Past Medical History:  Past Medical History:    Allergic rhinitis    Anxiety    Not formally diagnosed    Graves disease    Hyperthyroidism    Graves disease. Had AJ now on Synthoid    Hypothyroidism    After AJ    Sleep apnea     Past Surgical History:  Past Surgical History:   Procedure Laterality Date    D & c      Hysterectomy  2018    partial hystero.      ,     Skin surgery      Tonsillectomy       Family History:  Family History   Problem Relation Age of Onset    Heart Disorder Father     Diabetes Father     Hypertension Father     Heart Disease Father     Heart Disorder Mother     Hypertension Mother     Heart Disease Mother     Hypertension Sister       Social History:  Social History     Socioeconomic History    Marital status:    Tobacco Use    Smoking status: Never     Passive exposure: Never    Smokeless tobacco: Never   Vaping Use    Vaping status: Never Used   Substance and Sexual Activity    Alcohol use: Yes     Comment: Occasionally when out to dinner with friends    Drug use: Not Currently     Types: Cannabis     Comment: Haven’t tried or used in well over a year. Tried a gummy    Sexual activity: Yes     Partners: Male   Other Topics Concern    Caffeine Concern No    Exercise Yes     Comment: 1-3x per week    Seat Belt Yes    Special Diet Yes     Comment: Avoid dairy, lactose intolerance    Stress Concern No    Weight Concern Yes     Social Determinants of Health     Physical Activity: Sufficiently Active (12/8/2020)    Received from [facility]    Exercise Vital Sign     Days of Exercise per Week: 7 days     Minutes of Exercise per Session: 60 min    Received from [facility], [facility]    Social Connections    Received from [facility], [facility]    Housing Stability     ?  Allergies:  Allergies   Allergen Reactions    Azithromycin HIVES    Erythromycin OTHER (SEE COMMENTS) and UNKNOWN    Penicillins HIVES and OTHER (SEE COMMENTS)    Sulfa Antibiotics HIVES    Amoxicillin DIZZINESS    Milk Protein Extract OTHER (SEE COMMENTS)         Objective    Vitals:    07/30/24 1522   BP: 118/82   Pulse: 75   Resp: 16   Temp: 98.1 °F (36.7 °C)   SpO2: 95%   Weight: 214 lb (97.1 kg)       GEN: NAD, well-nourished.   HEENT: Head: NCAT. Face: No lesions. Eyes: Conjunctiva clear. Sclera are anicteric. PERRLA. EOMs are full. Ears: The right and left ear canals are clear.  Nose: No external or internal nasal deformities. Nasal septum is midline. Mouth: The lips are within normal limits.  No oral ulcers Tongue is midline with no lesions. The oral  cavity is clear.   -Mildly decreased salivary pooling  -1 oral ulcer in the mucosa of the lower lip    Neck: Supple. No neck masses. No thyromegaly. No LAD, parotid or submandicular gland palpated.   CV: RRR, no mrg, S1/S2  PULM: CTAB, no wrr, easy effort  Abd: s/nt/nd  Extremities: No cyanosis, edema or deformities.   Neurologic: Strength, CN2-12 grossly intact   Psych: normal affect.   Skin: Scattered areas of mild erythema with circular hypopigmented lesions    MSK: 28 joint count performed. No evidence of synovitis in mcp, pip, dip, wrist, elbows, shoulders, hips, knees, ankles, mtp unless otherwise noted. Full ROM of elbows, wrists, knees.     No peripheral joint synovitis  ?  Labs:      3/2024  CBC W differential WNL  NITIN is positive  SSA 3.7, SSB negative  SCL 70, Zena 1 is negative  dsDNA, SM, SM/RNP, RNP, chromatin negative  Creatinine 0.62, rest of CMP WNL      Lab Results   Component Value Date    WBC 9.8 03/25/2024    RBC 4.72 01/30/2024    HGB 14.2 01/30/2024    HCT 43.5 01/30/2024     03/25/2024    MCV 92.2 01/30/2024    MCH 30.1 01/30/2024    MCHC 32.6 01/30/2024    RDW 12.5 01/30/2024    NEPERCENT 57.2 01/30/2024    LYPERCENT 31.9 01/30/2024    MOPERCENT 7.4 01/30/2024    EOPERCENT 2.5 01/30/2024    BAPERCENT 1.0 01/30/2024    NE 5,720 01/30/2024    LYMABS 3,190 01/30/2024    MOABSO 740 01/30/2024    EOABSO 250 01/30/2024    BAABSO 100 01/30/2024     Lab Results   Component Value Date    AST 17 03/25/2024    ALT 19 03/25/2024     03/25/2024    K 4.2 03/25/2024         Lab Results   Component Value Date    NITIN positive 03/25/2024     No results found for: \"SSA\", \"SSAUR\", \"ANTISSA\", \"SSA52\", \"SSA60\", \"SSADD\", \"SSB\", \"ANTISSB\"  No results found for: \"DSDNA\", \"ANTIDSDNA\", \"SMUD\", \"ANTISM\", \"SM\", \"RNP\", \"ANTIRNP\", \"SMITHRNP\"  No results found for: \"SCL70\", \"SCL\", \"AESXPOS62\"  No results found for: \"C3\", \"C4\"  No results found for: \"DRVVT\", \"LAINT\", \"PTTLUPUS\", \"LUPUSINTERP\", \"LA\", \"L6HO7DGMRZ\",  \"I0OL9GMKRB\", \"E2BNIBCRAN\", \"X9LYCROQSH\"  No results found for: \"CARDIOLIPIGG\", \"CARDIOLIPIGM\", \"CARDIOLIPIGA\", \"CARDIOIGA\", \"CARLIP\"      Additional Labs:    Radiology:    Radiology review:      =====================================================================================================  Assessment and Plan    Assessment:  1. Undifferentiated connective tissue disease (HCC)    2. Positive NITIN (antinuclear antibody)    3. SS-A antibody positive    4. Fungal rash of trunk    5. Dry eye    6. Dry mouth    7. Oral ulcer      Positive SSA antibody with progressively worsening dry eye and xerostomia complicated by worsening/decaying dental fillings raises concern for primary Sjogren's disease vs undifferentiated connective tissue disease (UCTD).  -Oral ulcer noted today but these are relatively infrequent per report.  -Anterior chest rash appears more consistent with a cutaneous fungal etiology rather than cutaneous lupus or autoimmunity.  Not bothersome and not particularly pruritic as one would suspect if this was an autoimmune process.    ?  Plan:  -Repeat SSA antibody, obtain SPEP, UPCR, complements, inflammatory markers, RF/CCP, antiphospholipid antibodies    Consider myositis panel in the future if SSA antibody remains positive and anterior chest rash remains    Patient to ask dentist about possibility of Sjogren's disease. Positive SSA antibody is seen.  --If dryness is thought to be the cause of the patient's decaying teeth, I would recommend a secretagogue such as pilocarpine or cevimeline to prevent further tooth breakdown.    Dr. Malloy (optometry): Ask optometrist about possibility of Sjogren's disease. Positive SSA antibody is seen.   Sjogren's Dry eye testing  1) Schirmer's  2) Tear break up time  3) cornea staining (e.g. Lissamine Green, paulina bengal)    Trial ketoconazole 2% overlying anterior chest rash.    If rash is refractory to treatment, would pursue skin biopsy    Rtc 6-7 months    Diagnoses  and all orders for this visit:    Undifferentiated connective tissue disease (HCC)    Positive NITIN (antinuclear antibody)  -     Urinalysis with Culture Reflex  -     Creatinine, Urine, Random  -     Protein,Total,Urine, Random  -     C-Reactive Protein  -     Sed Rate, Westergren (Automated)  -     Complement C3, Serum  -     Complement C4, Serum  -     Sjogren'S Anti-SS-A  -     Protein Electrophoresis, with Total Protein and Reflex to ANDREW, Serum [[id number]] [Q]  -     Cyclic Citrullinate Pep. IGG  -     Rheumatoid Arthritis Factor  -     Anticardiolipin AB, IGM, Qn  -     Anticardiolipin Ab, IGG, Qn  -     Beta 2 Glycoprotein I AB, G/M  -     LUPUS ANTICOAGULANT REFLEX  -     ketoconazole 2 % External Cream; Apply 1 Application topically 2 (two) times daily. For 2 weeks    SS-A antibody positive  -     Urinalysis with Culture Reflex  -     Creatinine, Urine, Random  -     Protein,Total,Urine, Random  -     C-Reactive Protein  -     Sed Rate, Westergren (Automated)  -     Complement C3, Serum  -     Complement C4, Serum  -     Sjogren'S Anti-SS-A  -     Protein Electrophoresis, with Total Protein and Reflex to ANDREW, Serum [[id number]] [Q]  -     Cyclic Citrullinate Pep. IGG  -     Rheumatoid Arthritis Factor  -     Anticardiolipin AB, IGM, Qn  -     Anticardiolipin Ab, IGG, Qn  -     Beta 2 Glycoprotein I AB, G/M  -     LUPUS ANTICOAGULANT REFLEX  -     ketoconazole 2 % External Cream; Apply 1 Application topically 2 (two) times daily. For 2 weeks    Fungal rash of trunk  -     Urinalysis with Culture Reflex  -     Creatinine, Urine, Random  -     Protein,Total,Urine, Random  -     C-Reactive Protein  -     Sed Rate, Westergren (Automated)  -     Complement C3, Serum  -     Complement C4, Serum  -     Sjogren'S Anti-SS-A  -     Protein Electrophoresis, with Total Protein and Reflex to ANDREW, Serum [[id number]] [Q]  -     Cyclic Citrullinate Pep. IGG  -     Rheumatoid Arthritis Factor  -     Anticardiolipin AB, IGM, Qn  -      Anticardiolipin Ab, IGG, Qn  -     Beta 2 Glycoprotein I AB, G/M  -     LUPUS ANTICOAGULANT REFLEX  -     ketoconazole 2 % External Cream; Apply 1 Application topically 2 (two) times daily. For 2 weeks    Dry eye    Dry mouth    Oral ulcer        Return in about 6 months (around 2/5/2025).      The above plan of care, diagnosis, orders, and follow-up were discussed with the patient. Questions related to this recommended plan of care were answered.    Thank you for referring this delightful patient to me. Please feel free to contact me with any questions.     This report was performed utilizing speech recognition software technology. Despite proofreading, speech recognition errors could escape detection. If a word or phrase is confusing or out of context, please do not hesitate to call for   clarification.       Kind regards      Vel Tatum MD  [facility]

## 2024-07-30 NOTE — PATIENT INSTRUCTIONS
Ask dentist about possibility of Sjogren's disease. Positive SSA antibody is seen.     Dr. Malloy (optometry): Ask dentist about possibility of Sjogren's disease. Positive SSA antibody is seen.   Sjogren's Dry eye testing  1) Schirmer's  2) Tear break up time  3) cornea staining (e.g. Lissamine Green, paulina bengal)      Sjogren's Plan:  Dry eyes, try OTC eye drops: Systane, Refresh or Blink. Preservative-free (individually packaged) are the best as preservatives can dry the eyes out.     Dry Mouth Remedies: https://[email or web address]/[email or web address]-[email or web address]-directory/products-[email or web address]-dry-mouth   Highlights:   Dry Mouth Lozenges/tabs: ACT Dry mouth lozenges, XyliMelts,TheraMints, MIGHTEAFLOW Dry Mouth Chewing Gum/Lozenges  Dry Mouth Spray/Gels: Gels may be very useful at night to prevent nighttime \"cottonmouth\". 3M™ Xerostomia Relief Spray, Biotène® Moisturizing Mouth Spray/Gel/Liquid  Mouthwash/Toothpaste: Biotene, ACT products

## 2024-08-10 LAB
APPEARANCE: CLEAR
BILIRUBIN: NEGATIVE
COLOR: YELLOW
CREATININE, RANDOM URINE: 106 MG/DL (ref 20–275)
GLUCOSE: NEGATIVE
KETONES: NEGATIVE
LEUKOCYTE ESTERASE: NEGATIVE
NITRITE: NEGATIVE
OCCULT BLOOD: NEGATIVE
PH: 7.5 (ref 5–8)
PROTEIN, TOTAL, RANDOM UR: 9 MG/DL (ref 5–24)
PROTEIN: NEGATIVE
SPECIFIC GRAVITY: 1.02 (ref 1–1.03)

## 2024-08-12 LAB
ALBUMIN: 3.7 G/DL (ref 3.8–4.8)
ALPHA-1-GLOBULINS: 0.3 G/DL (ref 0.2–0.3)
ALPHA-2-GLOBULINS: 0.7 G/DL (ref 0.5–0.9)
B2 GLYCOPROTEIN I (IGA)AB: <2 U/ML
B2 GLYCOPROTEIN I (IGG)AB: <2 U/ML
B2 GLYCOPROTEIN I(IGM)AB: 4.6 U/ML
BETA 1 GLOBULIN: 0.5 G/DL (ref 0.4–0.6)
BETA 2 GLOBULIN: 0.4 G/DL (ref 0.2–0.5)
C-REACTIVE PROTEIN: 6.5 MG/L
CARDIOLIPIN AB (IGG): <2 GPL-U/ML
CARDIOLIPIN AB (IGM): 8.7 MPL-U/ML
COMPLEMENT COMPONENT C3C: 118 MG/DL (ref 83–193)
COMPLEMENT COMPONENT C4C: 28 MG/DL (ref 15–57)
CYCLIC CITRULLINATED$PEPTIDE (CCP) AB (IGG): <16 UNITS
DRVVT SCREEN: 35 SEC
GAMMA GLOBULINS: 1.3 G/DL (ref 0.8–1.7)
HEXAGONAL PHASE$CONFIRM: NEGATIVE
PROTEIN, TOTAL: 6.8 G/DL (ref 6.1–8.1)
PTT-LA SCREEN: 45 SEC
RHEUMATOID FACTOR: <10 IU/ML
SED RATE BY MODIFIED$WESTERGREN: 9 MM/H

## 2024-09-06 RX ORDER — ESTRADIOL 0.75 MG/.75G
1 GEL TOPICAL DAILY
Qty: 90 EACH | Refills: 2 | Status: SHIPPED | OUTPATIENT
Start: 2024-09-06

## 2024-09-30 ENCOUNTER — OFFICE VISIT (OUTPATIENT)
Dept: INTERNAL MEDICINE CLINIC | Facility: CLINIC | Age: 54
End: 2024-09-30
Payer: COMMERCIAL

## 2024-09-30 VITALS
TEMPERATURE: 98 F | OXYGEN SATURATION: 98 % | HEART RATE: 77 BPM | DIASTOLIC BLOOD PRESSURE: 78 MMHG | HEIGHT: 62.99 IN | WEIGHT: 219 LBS | BODY MASS INDEX: 38.8 KG/M2 | SYSTOLIC BLOOD PRESSURE: 122 MMHG

## 2024-09-30 DIAGNOSIS — E66.9 CLASS 2 OBESITY WITHOUT SERIOUS COMORBIDITY WITH BODY MASS INDEX (BMI) OF 39.0 TO 39.9 IN ADULT, UNSPECIFIED OBESITY TYPE: ICD-10-CM

## 2024-09-30 DIAGNOSIS — E89.0 POST-OPERATIVE HYPOTHYROIDISM: ICD-10-CM

## 2024-09-30 DIAGNOSIS — M35.9 UNDIFFERENTIATED CONNECTIVE TISSUE DISEASE (HCC): ICD-10-CM

## 2024-09-30 DIAGNOSIS — I47.19 ATRIAL TACHYCARDIA (HCC): ICD-10-CM

## 2024-09-30 DIAGNOSIS — E78.01 FAMILIAL HYPERCHOLESTEROLEMIA: ICD-10-CM

## 2024-09-30 DIAGNOSIS — G47.33 OSA ON CPAP: ICD-10-CM

## 2024-09-30 DIAGNOSIS — F41.9 ANXIETY: ICD-10-CM

## 2024-09-30 DIAGNOSIS — M35.01 SJOGREN'S SYNDROME WITH KERATOCONJUNCTIVITIS SICCA (HCC): ICD-10-CM

## 2024-09-30 DIAGNOSIS — Z00.00 ROUTINE PHYSICAL EXAMINATION: Primary | ICD-10-CM

## 2024-09-30 RX ORDER — TIRZEPATIDE 5 MG/.5ML
5 INJECTION, SOLUTION SUBCUTANEOUS WEEKLY
Qty: 2 ML | Refills: 0 | Status: SHIPPED | OUTPATIENT
Start: 2024-09-30 | End: 2024-10-22

## 2024-09-30 NOTE — PROGRESS NOTES
Patient Office Visit    ASSESSMENT AND PLAN:   1. Routine physical examination  Note: Continue to exercise at least 150 minutes a week and Eat a plant based diet. Please take 2000 IU of vitamin D daily for life to keep your bones strong. Please see your dentist every 6 months. Continue with regular eye exams.     2. Undifferentiated connective tissue disease (HCC)  Note: has seen rheumatologist. Continue with symptomatic treatment     3. Sjogren's syndrome with keratoconjunctivitis sicca (HCC)  Note: has seen the rheumatologist. Continue with symptomatic treatment     4. Familial hypercholesterolemia  Note: continue statin and follow up with the cardiologist     5. Post-operative hypothyroidism  Note: continue synthroid and follow up with the endocrinologist     6. Class 2 obesity without serious comorbidity with body mass index (BMI) of 39.0 to 39.9 in adult, unspecified obesity type  Note: will increase the dose. Tolerating 2.5 mg well   - Tirzepatide-Weight Management (ZEPBOUND) 5 MG/0.5ML Subcutaneous Solution Auto-injector; Inject 5 mg into the skin once a week for 4 doses.  Dispense: 2 mL; Refill: 0    7. Anxiety  Note: continue lexapro    8. JEOVANY on CPAP  Note: compliant     9. Atrial tachycardia (HCC)  Note: continue metoprolol       RTC in 3 months for a weight check     Patient/Caregiver Education: Patient/Caregiver Education: There are no barriers to learning. Medical education done. Outcome: Patient verbalizes understanding. Patient is notified to call with any questions, complications, allergies, or worsening or changing symptoms.  Patient is to call with any side effects or complications from the treatments as a result of today.      Reviewed Past Medical History and   Patient Active Problem List   Diagnosis    History of hysterectomy    History of Graves' disease    Family history of heart disease    Anxiety    Class 2 obesity without serious comorbidity with body mass index (BMI) of 39.0 to 39.9 in  adult    JEOVANY on CPAP    Post-operative hypothyroidism    Atrial tachycardia (HCC)    Familial hypercholesterolemia    Sjogren's syndrome with keratoconjunctivitis sicca (HCC)    Undifferentiated connective tissue disease (HCC)       No orders of the defined types were placed in this encounter.    Requested Prescriptions     Signed Prescriptions Disp Refills    Tirzepatide-Weight Management (ZEPBOUND) 5 MG/0.5ML Subcutaneous Solution Auto-injector 2 mL 0     Sig: Inject 5 mg into the skin once a week for 4 doses.         Cydney Ovalles,   CC:  Chief Complaint   Patient presents with    CPX         HPI:   Sonja Hines is a 54 year old female who presents for a physical    Obesity: she is paying out of pocket for zepbound. She is okay with increasing the dose and is checking with her cardiologist if any of the blood tests will help with coverage  Hyperlipidemia/hypothyroid: stable on medication  Sjogren's: stable     Past Medical History:    Allergic rhinitis    Anxiety    Not formally diagnosed    Graves disease    Hyperthyroidism    Graves disease. Had AJ now on Synthoid    Hypothyroidism    After AJ    Sleep apnea       Past Surgical History:   Procedure Laterality Date    D & c      Hysterectomy  2018    partial hystero.      ,     Skin surgery      Tonsillectomy         Social History:  Social History     Socioeconomic History    Marital status:    Tobacco Use    Smoking status: Never     Passive exposure: Never    Smokeless tobacco: Never   Vaping Use    Vaping status: Never Used   Substance and Sexual Activity    Alcohol use: Yes     Comment: Occasionally when out to dinner with friends    Drug use: Not Currently     Types: Cannabis     Comment: Haven’t tried or used in well over a year. Tried a gummy    Sexual activity: Yes     Partners: Male   Other Topics Concern    Caffeine Concern No    Exercise Yes     Comment: 1-3x per week    Seat Belt Yes    Special Diet Yes     Comment: Avoid  dairy, lactose intolerance    Stress Concern No    Weight Concern Yes     Social Determinants of Health     Financial Resource Strain: Low Risk  (9/25/2024)    Received from [facility]    Overall Financial Resource Strain (CARDIA)     Difficulty of Paying Living Expenses: Not hard at all   Food Insecurity: No Food Insecurity (9/25/2024)    Received from [facility]    Hunger Vital Sign     Worried About Running Out of Food in the Last Year: Never true     Ran Out of Food in the Last Year: Never true   Transportation Needs: No Transportation Needs (9/25/2024)    Received from [facility]    PRAPARE - Transportation     Lack of Transportation (Medical): No     Lack of Transportation (Non-Medical): No   Physical Activity: Sufficiently Active (12/8/2020)    [facility]    Exercise Vital Sign     Days of Exercise per Week: 7 days     Minutes of Exercise per Session: 60 min    [facility], [facility]    Social Connections   Housing Stability: Unknown (9/25/2024)    Received from [facility]    Housing Stability Vital Sign     Unable to Pay for Housing in the Last Year: No     Family History:  Family History   Problem Relation Age of Onset    Heart Disorder Father     Diabetes Father     Hypertension Father     Heart Disease Father     Heart Disorder Mother     Hypertension Mother     Heart Disease Mother     Hypertension Sister      Allergies:  Allergies   Allergen Reactions    Azithromycin HIVES    Erythromycin OTHER (SEE COMMENTS) and UNKNOWN    Penicillins HIVES and OTHER (SEE COMMENTS)    Sulfa Antibiotics HIVES    Amoxicillin DIZZINESS    Milk Protein Extract OTHER (SEE COMMENTS)     Current Meds:  Current Outpatient Medications on File Prior to Visit   Medication Sig Dispense Refill    Estradiol (DIVIGEL) 0.75 MG/0.75GM Transdermal  Gel Place 1 packet onto the skin daily. 90 each 2    albuterol 108 (90 Base) MCG/ACT Inhalation Aero Soln Inhale 1 puff into the lungs every 6 (six) hours as needed. 18 g 0    ESCITALOPRAM 10 MG Oral Tab TAKE 1 TABLET(10 MG) BY MOUTH DAILY 90 tablet 4    rosuvastatin 5 MG Oral Tab Take 1 tablet (5 mg total) by mouth every other day.      ergocalciferol 1.25 MG (00031 UT) Oral Cap       metoprolol succinate ER 25 MG Oral Tablet 24 Hr Take 0.5 tablets (12.5 mg total) by mouth daily. Half of 25mg      SYNTHROID 125 MCG Oral Tab       ketoconazole 2 % External Cream Apply 1 Application topically 2 (two) times daily. For 2 weeks (Patient not taking: Reported on 9/30/2024) 30 g 2     No current facility-administered medications on file prior to visit.         REVIEW OF SYSTEMS   Constitutional: no fatigue normal energy no weight changes   HENT: normal sinuses and no mouth issues   Eyes: . normal vision no eye pain   Respiratory: normal respirations no cough   Cardiovascular: no CP, or palpitations   Gastrointestinal: normal bowels and no abd pains   Genitourinary:  normal urination no hematuria, no frequency   Musculoskeletal: no pains in arms/legs, normal range of motion   Skin: no rashes or skin lesions that are new   Neurological:  no weakness, no numbness, normal gait   Hematological:  no bruises or bleeding   Psychiatric/Behavioral: normal mood no anxiety normal behavior     /78   Pulse 77   Temp 97.6 °F (36.4 °C) (Temporal)   Ht 5' 2.99\" (1.6 m)   Wt 219 lb (99.3 kg)   SpO2 98%   BMI 38.80 kg/m²     PHYSICAL EXAM:   Constitutional: Vital signs reviewed as noted, well developed, in no acute distress.   HENT: NCAT, bilateral ear canal and tympanic membrane appear normal  Eyes: pupils reactive bilaterally  Neck: No thyroidmegaly  Cardiovascular: nl s1 s2 no m/r/g  Pulmonary/Chest: CTA bilaterally with no wheezes  Abdominal: Soft NT normal Bowel sounds  Extremities: no pedal edema   Neurological:  no  weakness in UE and LE, reflexes are normal  Skin: no rashes or bruises on visualized skin, not undressed   Psychiatric:normal mood

## 2024-09-30 NOTE — PATIENT INSTRUCTIONS
Continue to exercise at least 150 minutes a week and Eat a plant based diet     Please take 2000 IU of vitamin D daily for life to keep your bones strong  Please see your dentist every 6 months  Continue with regular eye exams    Please get the flu vaccine through the pharmacy    Please continue your medications    I have increased the zepbound to 5 mg and see me back in 3 months for a weight check    Let me know what blood tests Dr. Richardson has ordered

## 2024-10-10 ENCOUNTER — PATIENT MESSAGE (OUTPATIENT)
Dept: INTERNAL MEDICINE CLINIC | Facility: CLINIC | Age: 54
End: 2024-10-10

## 2024-10-10 DIAGNOSIS — E66.812 CLASS 2 OBESITY WITHOUT SERIOUS COMORBIDITY WITH BODY MASS INDEX (BMI) OF 39.0 TO 39.9 IN ADULT, UNSPECIFIED OBESITY TYPE: Primary | ICD-10-CM

## 2024-10-11 NOTE — TELEPHONE ENCOUNTER
MA - Have we received any results from cardiology office?     SV: Pt requesting if she can be on the wait list for the weight loss clinic if they don't approve on weight loss med. Pended referral, please sign if agree, TY!

## 2024-12-25 ENCOUNTER — PATIENT MESSAGE (OUTPATIENT)
Dept: INTERNAL MEDICINE CLINIC | Facility: CLINIC | Age: 54
End: 2024-12-25

## 2024-12-30 ENCOUNTER — PATIENT MESSAGE (OUTPATIENT)
Dept: INTERNAL MEDICINE CLINIC | Facility: CLINIC | Age: 54
End: 2024-12-30

## 2024-12-30 ENCOUNTER — TELEPHONE (OUTPATIENT)
Dept: INTERNAL MEDICINE CLINIC | Facility: CLINIC | Age: 54
End: 2024-12-30

## 2024-12-30 DIAGNOSIS — N63.20 MASS OF LEFT BREAST, UNSPECIFIED QUADRANT: Primary | ICD-10-CM

## 2024-12-30 DIAGNOSIS — G47.33 OSA ON CPAP: Primary | ICD-10-CM

## 2024-12-30 NOTE — TELEPHONE ENCOUNTER
[facility]  [phone]    Order needed for Diagnostic Mammogram, requesting a return call to inform radiology dept once order is in system so they may reach out to patient to schedule.

## 2024-12-30 NOTE — TELEPHONE ENCOUNTER
Dr. Ovalles- Ok to place diagnostic mammogram? Last mammogram -12/12 Pended orders. Please sign if agree. TY

## 2025-01-02 ENCOUNTER — TELEPHONE (OUTPATIENT)
Dept: INTERNAL MEDICINE CLINIC | Facility: CLINIC | Age: 55
End: 2025-01-02

## 2025-01-02 DIAGNOSIS — N63.0 MASS OF BREAST, UNSPECIFIED LATERALITY: Primary | ICD-10-CM

## 2025-01-02 NOTE — TELEPHONE ENCOUNTER
Spoke to patient who is requesting a new pulmonary and sleep medicine providers since her original providers moved.    Dr. Ovalles- Pended referrals. Please sign if agree. TY    *Patient would like a mychart message with new PCPs

## 2025-01-02 NOTE — TELEPHONE ENCOUNTER
Radiology calling need updated order, TONIE needs to be for Bilateral not just for left. Asking if SV can place new order so pt can schedule.

## 2025-01-03 NOTE — TELEPHONE ENCOUNTER
SV: Orders pended for referrals per patient request. Looks like this wasn't routed to you. Please review and sign if you agree. TY

## 2025-01-07 ENCOUNTER — HOSPITAL ENCOUNTER (OUTPATIENT)
Dept: MAMMOGRAPHY | Age: 55
Discharge: HOME | End: 2025-01-07
Attending: INTERNAL MEDICINE
Payer: COMMERCIAL

## 2025-01-07 ENCOUNTER — HOSPITAL ENCOUNTER (OUTPATIENT)
Dept: ULTRASOUND IMAGING | Age: 55
Discharge: HOME | End: 2025-01-07
Attending: INTERNAL MEDICINE
Payer: COMMERCIAL

## 2025-01-07 DIAGNOSIS — N63.0 MASS OF BREAST, UNSPECIFIED LATERALITY: ICD-10-CM

## 2025-01-07 PROCEDURE — 77066 DX MAMMO INCL CAD BI: CPT | Performed by: INTERNAL MEDICINE

## 2025-01-07 PROCEDURE — 76642 ULTRASOUND BREAST LIMITED: CPT | Performed by: INTERNAL MEDICINE

## 2025-01-07 PROCEDURE — 77062 BREAST TOMOSYNTHESIS BI: CPT | Performed by: INTERNAL MEDICINE

## 2025-02-04 ENCOUNTER — OFFICE VISIT (OUTPATIENT)
Dept: RHEUMATOLOGY | Facility: CLINIC | Age: 55
End: 2025-02-04
Payer: COMMERCIAL

## 2025-02-04 VITALS
RESPIRATION RATE: 16 BRPM | OXYGEN SATURATION: 98 % | WEIGHT: 212 LBS | HEART RATE: 78 BPM | HEIGHT: 63 IN | DIASTOLIC BLOOD PRESSURE: 72 MMHG | TEMPERATURE: 98 F | SYSTOLIC BLOOD PRESSURE: 108 MMHG | BODY MASS INDEX: 37.56 KG/M2

## 2025-02-04 DIAGNOSIS — H04.129 DRY EYE: ICD-10-CM

## 2025-02-04 DIAGNOSIS — R76.8 POSITIVE ANA (ANTINUCLEAR ANTIBODY): ICD-10-CM

## 2025-02-04 DIAGNOSIS — R68.2 DRY MOUTH: ICD-10-CM

## 2025-02-04 DIAGNOSIS — M35.01 SJOGREN'S SYNDROME WITH KERATOCONJUNCTIVITIS SICCA (HCC): Primary | ICD-10-CM

## 2025-02-04 DIAGNOSIS — R76.8 SS-A ANTIBODY POSITIVE: ICD-10-CM

## 2025-02-04 PROCEDURE — G2211 COMPLEX E/M VISIT ADD ON: HCPCS | Performed by: INTERNAL MEDICINE

## 2025-02-04 PROCEDURE — 3008F BODY MASS INDEX DOCD: CPT | Performed by: INTERNAL MEDICINE

## 2025-02-04 PROCEDURE — 99214 OFFICE O/P EST MOD 30 MIN: CPT | Performed by: INTERNAL MEDICINE

## 2025-02-04 PROCEDURE — 3078F DIAST BP <80 MM HG: CPT | Performed by: INTERNAL MEDICINE

## 2025-02-04 PROCEDURE — 3074F SYST BP LT 130 MM HG: CPT | Performed by: INTERNAL MEDICINE

## 2025-02-04 RX ORDER — HYDROXYCHLOROQUINE SULFATE 200 MG/1
200 TABLET, FILM COATED ORAL 2 TIMES DAILY
Qty: 180 TABLET | Refills: 2 | Status: SHIPPED | OUTPATIENT
Start: 2025-02-04

## 2025-02-04 NOTE — PROGRESS NOTES
Rheumatology f/u Patient Note  =====================================================================================================  ?  Chief complaint: +SSA antibody  Chief Complaint   Patient presents with    Follow - Up     Follow up. Pt states symptoms have remained the same since LOV.      Referring   Dr. Yessi Marroquin (derm)    PCP  Cydney Ovalles DO  Fax: [phone]  Phone: [phone]    =====================================================================================================  HPI   Date of visit: 7/30/2024    Sonja Hines is a 54 year old female     Here for further evaluation of a positive SSA antibody.  Rash on the chest that developed in 2023. Tried over-the-counter antifungal cream without effect.  -dermatologist: Dr. Marroquin. Tried multiple steroid creams including TAC.  -rash has actually worsened.   -patient and sister with GRAVES disease.  Patient s/p AJ. On Synthyroid now.   -dry eye since ELLE/exophthalmos since Graves disease. Dry eye a bit worse recently  -dry mouth: worsening and more fillings and teeth cracking now per dentist  -Unclear if photosensitive rash and fatigue.  Felt very fatigued on the [place] holiday last year  -oral ulcers x 7 days. One today.  Very intermittent  Denies any new areas of joint pain/swelling  Denies any new lymphadenopathy  Denies any new peripheral neuropathy  Denies any definitive photosensitivity  Denies any significant fatigue or night sweats or unexpected weight loss.   Denies current alopecia, malar rash, discoid lesions, oral/nasal ulcers, pleuritic chest pain, arthritis, seizures/psychosis, Raynaud's, dry eyes/mouth, or blood clots.  Denies miscarriages or obstetric events (early pre-eclampsia, IUGR, placental insufficiency)  -Prior pregnancies and/or children: 2 children  --Pregnancy complications: none  --no  miscarriages  ==============================================================================================================  Today's Visit: 02/04/25    Doing well.  Still with a rash.  Saw dermatology recently.  They are worried about autoimmune rash.  No biopsy recently.  Ketoconazole cream did not help at the last visit  Dry eye: cannot wear contacts  Dry mouth: saw dentist    Denies any new areas of joint pain/swelling  Denies any skin rashes.   Denies any new lymphadenopathy  Denies any new peripheral neuropathy  Denies any photosensitivity  Denies any significant fatigue or night sweats or unexpected weight loss.  -Weight loss was intentional      5 point ROS negative except noted above  I had reviewed past medical and family histories together with allergy and medication lists documented.      Wt Readings from Last 6 Encounters:   02/04/25 212 lb (96.2 kg)   09/30/24 219 lb (99.3 kg)   07/30/24 214 lb (97.1 kg)   05/14/24 219 lb (99.3 kg)   05/02/24 222 lb 3.6 oz (100.8 kg)   02/08/24 226 lb 6.4 oz (102.7 kg)         Medications:  Current Outpatient Medications on File Prior to Visit   Medication Sig Dispense Refill    Estradiol (DIVIGEL) 0.75 MG/0.75GM Transdermal Gel Place 1 packet onto the skin daily. 90 each 2    albuterol 108 (90 Base) MCG/ACT Inhalation Aero Soln Inhale 1 puff into the lungs every 6 (six) hours as needed. 18 g 0    ESCITALOPRAM 10 MG Oral Tab TAKE 1 TABLET(10 MG) BY MOUTH DAILY 90 tablet 4    rosuvastatin 5 MG Oral Tab Take 1 tablet (5 mg total) by mouth every other day.      ergocalciferol 1.25 MG (03020 UT) Oral Cap       metoprolol succinate ER 25 MG Oral Tablet 24 Hr Take 0.5 tablets (12.5 mg total) by mouth daily. Half of 25mg      SYNTHROID 125 MCG Oral Tab        No current facility-administered medications on file prior to visit.     Allergies:  Allergies   Allergen Reactions    Azithromycin HIVES    Erythromycin OTHER (SEE COMMENTS) and UNKNOWN    Penicillins HIVES and OTHER  (SEE COMMENTS)    Sulfa Antibiotics HIVES    Amoxicillin DIZZINESS    Milk Protein Extract OTHER (SEE COMMENTS)         Objective    Vitals:    02/04/25 1417   BP: 108/72   Pulse: 78   Resp: 16   Temp: 98.2 °F (36.8 °C)   SpO2: 98%   Weight: 212 lb (96.2 kg)   Height: 5' 3\" (1.6 m)       GEN: NAD, well-nourished.   HEENT: Head: NCAT. Face: No lesions. Eyes: Conjunctiva clear. Sclera are anicteric. PERRLA. EOMs are full.   CV: RRR, no mrg, S1/S2  PULM:  CTAB, easy effort  Extremities: No cyanosis, edema or deformities.   Neurologic: Strength, CN2-12 grossly intact   Psych: normal affect.   Skin: Annular erythema with central clearing in the anterior chest  MSK: 28 joint count performed. No evidence of synovitis in mcp, pip, dip, wrist, elbows, shoulders, hips, knees, ankles, mtp unless otherwise noted. Full ROM of elbows, wrists, knees.     -No synovitis  ?  Labs:  8/9/2024  Lupus anticoagulant was all negative  Beta-2 glycoprotein/anticardiolipin IgG/IgM/IgA is negative  RF, CCP is negative  SPEP is negative for an M spike  SSA 3.2 which is positive  C3/C4 WNL  Sed rate 9  CRP 0  UPCR 9/106  U/a WNL    3/2024  CBC W differential WNL  NITIN is positive  SSA 3.7, SSB negative  SCL 70, Zena 1 is negative  dsDNA, SM, SM/RNP, RNP, chromatin negative  Creatinine 0.62, rest of CMP WNL      Lab Results   Component Value Date    WBC 9.8 03/25/2024    RBC 4.72 01/30/2024    HGB 14.2 01/30/2024    HCT 43.5 01/30/2024     03/25/2024    MCV 92.2 01/30/2024    MCH 30.1 01/30/2024    MCHC 32.6 01/30/2024    RDW 12.5 01/30/2024    NEPERCENT 57.2 01/30/2024    LYPERCENT 31.9 01/30/2024    MOPERCENT 7.4 01/30/2024    EOPERCENT 2.5 01/30/2024    BAPERCENT 1.0 01/30/2024    NE 5,720 01/30/2024    LYMABS 3,190 01/30/2024    MOABSO 740 01/30/2024    EOABSO 250 01/30/2024    BAABSO 100 01/30/2024     Lab Results   Component Value Date    AST 17 03/25/2024    ALT 19 03/25/2024    TP 6.8 08/09/2024     03/25/2024    K 4.2  03/25/2024         Lab Results   Component Value Date    NITIN positive 03/25/2024     No results found for: \"SSA\", \"SSAUR\", \"ANTISSA\", \"SSA52\", \"SSA60\", \"SSADD\", \"SSB\", \"ANTISSB\"  No results found for: \"DSDNA\", \"ANTIDSDNA\", \"SMUD\", \"ANTISM\", \"SM\", \"RNP\", \"ANTIRNP\", \"SMITHRNP\"  No results found for: \"SCL70\", \"SCL\", \"OHAHXXY87\"  No results found for: \"C3\", \"C4\"  No results found for: \"DRVVT\", \"LAINT\", \"PTTLUPUS\", \"LUPUSINTERP\", \"LA\", \"R2PP4ENMDY\", \"O7IC5PJHWX\", \"R1GLBBUZMA\", \"S4ZLUGTCTV\"  No results found for: \"CARDIOLIPIGG\", \"CARDIOLIPIGM\", \"CARDIOLIPIGA\", \"CARDIOIGA\", \"CARLIP\"      Additional Labs:    Radiology:    Radiology review:      =====================================================================================================  Assessment and Plan  Assessment:  1. Sjogren's syndrome with keratoconjunctivitis sicca (HCC)    2. Positive NITIN (antinuclear antibody)    3. SS-A antibody positive    4. Dry eye    5. Dry mouth      #Positive SSA Sjogren's disease  -progressively worsening dry eye and xerostomia  -Dry eye diagnosed by eye care provider  -Oral ulcer noted today but these are relatively infrequent per report.  -Anterior chest rash: Could represent subacute cutaneous lupus erythematosus phenotype given annular distribution    ?  Plan:  -Repeat Sjogren's disease blood work annually, next in July/August 2025  -Patient defers skin biopsy given risk of keloid formation which is occurred in the past  -For suspected cutaneous inflammation: Hydroxychloroquine (plaquenil) start: Always take WITH food. Take 1 tab daily for 2 weeks, and if tolerating, then increase to 1 tab twice daily.    Plaquenil risks include (including and not limited to rash, Nausea, vomiting, neuromyopathy, and retinal toxicity (eye problems leading to vision loss). Patients should get an eye exam by opthalmology within the first year of taking the medication, after 5 years on medication, then annual eye exam to evaluate for plaquenil  toxicity.       Here is a link to the American College [employer] Rhe[employer] fact-sheet about plaquenil:   https://www.rheumatology.org/Portals/0/Files/Hydroxychloroquine-Plaquenil-Fact-Sheet.pdf      Rtc 6 months    Diagnoses and all orders for this visit:    Sjogren's syndrome with keratoconjunctivitis sicca (HCC)  -     hydroxychloroquine 200 MG Oral Tab; Take 1 tablet (200 mg total) by mouth 2 (two) times daily.    Positive NITIN (antinuclear antibody)  -     Comp Metabolic Panel (14); Future  -     CBC With Differential With Platelet; Future  -     Urinalysis with Culture Reflex; Future  -     Creatinine, Urine, Random; Future  -     Protein,Total,Urine, Random; Future  -     C-Reactive Protein; Future  -     Sed Rate, Westergren (Automated); Future  -     Complement C3, Serum; Future  -     Complement C4, Serum; Future  -     Protein Electrophoresis, with Total Protein and Reflex to ANDREW, Serum [[id number]] [Q]; Future    SS-A antibody positive  -     Comp Metabolic Panel (14); Future  -     CBC With Differential With Platelet; Future  -     Urinalysis with Culture Reflex; Future  -     Creatinine, Urine, Random; Future  -     Protein,Total,Urine, Random; Future  -     C-Reactive Protein; Future  -     Sed Rate, Westergren (Automated); Future  -     Complement C3, Serum; Future  -     Complement C4, Serum; Future  -     Protein Electrophoresis, with Total Protein and Reflex to ANDREW, Serum [[id number]] [Q]; Future    Dry eye    Dry mouth          Return in about 27 weeks (around 8/12/2025).      The above plan of care, diagnosis, orders, and follow-up were discussed with the patient. Questions related to this recommended plan of care were answered.    Thank you for referring this delightful patient to me. Please feel free to contact me with any questions.     This report was performed utilizing speech recognition software technology. Despite proofreading, speech recognition errors could escape detection. If a word or phrase is  confusing or out of context, please do not hesitate to call for   clarification.       Kind regards      Vel Tatum MD  [facility]

## 2025-02-04 NOTE — PATIENT INSTRUCTIONS
Hydroxychloroquine (plaquenil) start: Always take WITH food. Take 1 tab daily for 2 weeks, and if tolerating, then increase to 1 tab twice daily.

## 2025-02-06 ENCOUNTER — PATIENT MESSAGE (OUTPATIENT)
Dept: INTERNAL MEDICINE CLINIC | Facility: CLINIC | Age: 55
End: 2025-02-06

## 2025-02-06 DIAGNOSIS — R39.9 UTI SYMPTOMS: Primary | ICD-10-CM

## 2025-02-09 LAB
APPEARANCE: CLEAR
BILIRUBIN: NEGATIVE
COLOR: YELLOW
GLUCOSE: NEGATIVE
KETONES: NEGATIVE
LEUKOCYTE ESTERASE: NEGATIVE
NITRITE: NEGATIVE
OCCULT BLOOD: NEGATIVE
PH: 6.5 (ref 5–8)
PROTEIN: NEGATIVE
SPECIFIC GRAVITY: 1.01 (ref 1–1.03)

## 2025-02-13 ENCOUNTER — OFFICE VISIT (OUTPATIENT)
Dept: OBGYN CLINIC | Facility: CLINIC | Age: 55
End: 2025-02-13
Payer: COMMERCIAL

## 2025-02-13 VITALS
WEIGHT: 212.5 LBS | DIASTOLIC BLOOD PRESSURE: 80 MMHG | HEIGHT: 63 IN | BODY MASS INDEX: 37.65 KG/M2 | SYSTOLIC BLOOD PRESSURE: 116 MMHG

## 2025-02-13 DIAGNOSIS — R30.0 DYSURIA: Primary | ICD-10-CM

## 2025-02-13 DIAGNOSIS — N95.2 VAGINAL ATROPHY: ICD-10-CM

## 2025-02-13 PROCEDURE — 3074F SYST BP LT 130 MM HG: CPT | Performed by: OBSTETRICS & GYNECOLOGY

## 2025-02-13 PROCEDURE — 99213 OFFICE O/P EST LOW 20 MIN: CPT | Performed by: OBSTETRICS & GYNECOLOGY

## 2025-02-13 PROCEDURE — 3079F DIAST BP 80-89 MM HG: CPT | Performed by: OBSTETRICS & GYNECOLOGY

## 2025-02-13 PROCEDURE — 3008F BODY MASS INDEX DOCD: CPT | Performed by: OBSTETRICS & GYNECOLOGY

## 2025-02-13 RX ORDER — ESTRADIOL 0.1 MG/G
CREAM VAGINAL
Qty: 42.5 G | Refills: 3 | Status: SHIPPED | OUTPATIENT
Start: 2025-02-13

## 2025-02-13 RX ORDER — ESTRADIOL 0.1 MG/G
CREAM VAGINAL DAILY
Refills: 0 | Status: CANCELLED | OUTPATIENT
Start: 2025-02-13

## 2025-02-13 NOTE — PROGRESS NOTES
Chief Complaint   Patient presents with    Gyn Exam     Pelvic floor discomfort when urinating that began a week and a half ago.         Sonja Hines is a 54 year old female who presents for pelvic floor discomfort.    LMP: Hysterectomy.    Birth control or HRT: Divigel.   Refill yes  Last Pap Smear: n/a . Any history of abnormal paps: none   Gardasil:(age 9-44 y/o) n/a.   Any medication refills needed today?: Divigel    Problems/concerns: discomfort.      Next Appt: 25        Immunization History   Administered Date(s) Administered    Covid-19 Vaccine [employer] (J[employer]J) 0.5ml 2021    Covid-19 Vaccine Moderna 100 mcg/0.5 ml 02/10/2022    FLULAVAL 6 months & older 0.5 ml Prefilled syringe (82035) 11/15/2022    FLUZONE 6 months and older PFS 0.5 ml (94411) 2020, 2023    Influenza 2021    TDAP 2021    Zoster Vaccine Recombinant Adjuvanted (Shingrix) 2024, 2024         Current Outpatient Medications:     hydroxychloroquine 200 MG Oral Tab, Take 1 tablet (200 mg total) by mouth 2 (two) times daily., Disp: 180 tablet, Rfl: 2    Estradiol (DIVIGEL) 0.75 MG/0.75GM Transdermal Gel, Place 1 packet onto the skin daily., Disp: 90 each, Rfl: 2    albuterol 108 (90 Base) MCG/ACT Inhalation Aero Soln, Inhale 1 puff into the lungs every 6 (six) hours as needed., Disp: 18 g, Rfl: 0    ESCITALOPRAM 10 MG Oral Tab, TAKE 1 TABLET(10 MG) BY MOUTH DAILY, Disp: 90 tablet, Rfl: 4    rosuvastatin 5 MG Oral Tab, Take 1 tablet (5 mg total) by mouth every other day., Disp: , Rfl:     ergocalciferol 1.25 MG (82890 UT) Oral Cap, , Disp: , Rfl:     metoprolol succinate ER 25 MG Oral Tablet 24 Hr, Take 0.5 tablets (12.5 mg total) by mouth daily. Half of 25mg, Disp: , Rfl:     SYNTHROID 125 MCG Oral Tab, , Disp: , Rfl:     Allergies[1]    OB History    Para Term  AB Living   2 2 2 0 0 0   SAB IAB Ectopic Multiple Live Births   0 0 0 0 0      # Outcome Date GA Lbr Efren/2nd Weight Sex Type Anes  PTL Lv   2 Term 03/10/00    M Vag-Spont None     1 Term 96    M Vag-Spont EPI         Gyn History      No data recorded       No data to display                    Past Medical History:    Allergic rhinitis    Anxiety    Not formally diagnosed    Graves disease    Hyperthyroidism    Graves disease. Had AJ now on Synthoid    Hypothyroidism    After AJ    Sleep apnea       Past Surgical History:   Procedure Laterality Date    D & c      Freeing bowel adhesion,enterolysis N/A 2024    Hysterectomy  2018    partial hystero.      ,     Skin surgery      Tonsillectomy         Family History   Problem Relation Age of Onset    Heart Disorder Father     Diabetes Father     Hypertension Father     Heart Disease Father     Heart Disorder Mother     Hypertension Mother     Heart Disease Mother     Hypertension Sister         Tobacco  Allergies  Meds         Social History     Socioeconomic History    Marital status:      Spouse name: Not on file    Number of children: Not on file    Years of education: Not on file    Highest education level: Not on file   Occupational History    Not on file   Tobacco Use    Smoking status: Never     Passive exposure: Never    Smokeless tobacco: Never   Vaping Use    Vaping status: Never Used   Substance and Sexual Activity    Alcohol use: Yes     Comment: Occasionally when out to dinner with friends    Drug use: Not Currently     Types: Cannabis     Comment: Haven’t tried or used in well over a year. Tried a gummy    Sexual activity: Yes     Partners: Male   Other Topics Concern    Caffeine Concern No    Exercise Yes     Comment: 1-3x per week    Seat Belt Yes    Special Diet Yes     Comment: Avoid dairy, lactose intolerance    Stress Concern No    Weight Concern Yes   Social History Narrative    Not on file     Social Drivers of Health     Food Insecurity: No Food Insecurity (2024)    Received from [facility]    Food Insecurity      Currently or in the past 3 months, have you worried your food would run out before you had money to buy more?: No     In the past 12 months, have you run out of food or been unable to get more?: No   Transportation Needs: No Transportation Needs (12/21/2024)    Received from [facility]    Transportation Needs     Currently or in the past 3 months, has lack of transportation kept you from medical appointments, getting food or medicine, or providing care to a family member?: Unrecognized value     : Not on file     Medical Transportation Needs?: No     Daily Living Transportation Needs? [Peds Only] : Not on file   Stress: Not on file   Housing Stability: Unknown (9/25/2024)    Received from [facility]    Housing Stability Vital Sign     Unable to Pay for Housing in the Last Year: No     Number of Times Moved in the Last Year: Not on file     Homeless in the Last Year: Not on file     /80   Ht 5' 3\" (1.6 m)   Wt 212 lb 8.4 oz     Wt Readings from Last 3 Encounters:   02/13/25 212 lb 8.4 oz   02/04/25 212 lb   09/30/24 219 lb       Health Maintenance   Topic Date Due    Pneumococcal Vaccine: 50+ Years (1 of 1 - PCV) Never done    COVID-19 Vaccine (3 - 2024-25 season) 09/01/2024    Influenza Vaccine (1) 10/01/2024    Annual Depression Screening  01/01/2025    Annual Physical  09/30/2025    Mammogram  01/07/2026    Colorectal Cancer Screening  05/16/2027    DTaP,Tdap,and Td Vaccines (2 - Td or Tdap) 03/03/2031    Zoster Vaccines  Completed    Meningococcal B Vaccine  Aged Out         Review of Systems   General: Present- Feeling well. Not Present- Fever.  Female Genitourinary: Not Present- Dysmenorrhea, Dyspareunia, Flank Pain, Frequency, Menstrual Irregularities, Pelvic Pain, Urgency, Urinary Complaints, Vaginal Bleeding and Vaginal dryness.  Pain: Present- Pain Rating - 0 on a 0-10 scale.  All other systems negative       Physical Exam   The physical exam findings are as  follows:     Abdomen   Inspection: - Inspection Normal.  Palpation/Percussion: Palpation and Percussion of the abdomen reveal - Non Tender, No hepatosplenomegaly and No Palpable abdominal masses.    Female Genitourinary     External Genitalia   Perineum - Normal. Bartholin's Gland - Bilateral - Normal. Clitoris - Normal.  Introitus: Characteristics - Normal. Discharge - None.  Labia Majora: Lesions - Bilateral - None. Characteristics - Bilateral - Normal.  Labia Minora: Lesions - Bilateral - None. Characteristics - Bilateral - Normal.  Urethra: Characteristics - Normal. Discharge - None.  [place] Gland - Bilateral - Normal.  Vulva: Characteristics - Normal. Lesions - None.    Bimanual   Vagina: atrophic   Vaginal Wall: - Normal.  Vaginal Lesions - None. Vaginal Mucosa - Normal.  Cervix: Characteristics - No Motion tenderness. Discharge - None.  Uterus: Characteristics - Non Tender. Position - Midposition.  Adnexa: Characteristics - Bilateral - Tender. Masses - No Adnexal Masses.  Bladder - Normal.  Vaginal discharge - Clear . Amine Odor - Absent. Main patient complaints - Dysuria     Rectal   Anorectal Exam: External - normal external exam.    Lymphatic  General Lymphatics   Description - Normal .    1. Dysuria    2. Vaginal atrophy      __  Pt reports pelvic discomfort started over 1 1/2 weeks ago - describes it as when she is done urinating she feels like she has to release more urine but does not.  Pt reports she had UA done by PCP 1 wk ago - normal.  Discussed risks/benefits of estrogen vaginal cream.  Encouraged pt to RTC if UA and Vaginitis Panel negative and 2 mo trial of estrogen cream fails to resolve pelvic discomfort.    Nely ANNE-S      Patient seen and examined, Agree with assessment and plan of care documented by PA student.     Xiomara Sam MD             [1]   Allergies  Allergen Reactions    Azithromycin HIVES    Erythromycin OTHER (SEE COMMENTS) and UNKNOWN    Penicillins HIVES  and OTHER (SEE COMMENTS)    Sulfa Antibiotics HIVES    Amoxicillin DIZZINESS    Milk Protein Extract OTHER (SEE COMMENTS)

## 2025-02-14 LAB
BILIRUB UR QL: NEGATIVE
BV BACTERIA DNA VAG QL NAA+PROBE: NEGATIVE
C GLABRATA DNA VAG QL NAA+PROBE: NEGATIVE
C KRUSEI DNA VAG QL NAA+PROBE: NEGATIVE
CANDIDA DNA VAG QL NAA+PROBE: NEGATIVE
CLARITY UR: CLEAR
GLUCOSE UR-MCNC: NORMAL MG/DL
HGB UR QL STRIP.AUTO: NEGATIVE
KETONES UR-MCNC: NEGATIVE MG/DL
LEUKOCYTE ESTERASE UR QL STRIP.AUTO: NEGATIVE
NITRITE UR QL STRIP.AUTO: NEGATIVE
PH UR: 7.5 [PH] (ref 5–8)
PROT UR-MCNC: NEGATIVE MG/DL
SP GR UR STRIP: 1.02 (ref 1–1.03)
T VAGINALIS DNA VAG QL NAA+PROBE: NEGATIVE
UROBILINOGEN UR STRIP-ACNC: NORMAL

## 2025-02-25 RX ORDER — TIRZEPATIDE 5 MG/.5ML
5 INJECTION, SOLUTION SUBCUTANEOUS WEEKLY
Qty: 2 ML | Refills: 0 | OUTPATIENT
Start: 2025-02-25 | End: 2025-03-19

## 2025-02-25 NOTE — TELEPHONE ENCOUNTER
Protocol NONE    Requesting: Tirzepatide-Weight Management (ZEPBOUND) 5 MG/0.5ML Subcutaneous Solution Auto-injector     LOV: 9/30/24  RTC: 3 MONTHS FOR WEIGHT CHECK  Filled: 9/30//24  Recent Labs: 5/10/24    Upcoming OV   Future Appointments   Date Time Provider Department Center   5/13/2025  4:00 PM Xiomara Sam MD [facility]   5/29/2025  8:00 AM Renay Sanchez APRN [facility]   6/2/2025  1:30 PM Annabelle Mena DO [facility] [place]   8/12/2025  2:15 PM Vel Tatum MD [facility] [place]

## 2025-03-05 ENCOUNTER — TELEPHONE (OUTPATIENT)
Dept: INTERNAL MEDICINE CLINIC | Facility: CLINIC | Age: 55
End: 2025-03-05

## 2025-03-06 ENCOUNTER — WALK IN (OUTPATIENT)
Dept: URGENT CARE | Age: 55
End: 2025-03-06

## 2025-03-06 VITALS
DIASTOLIC BLOOD PRESSURE: 70 MMHG | SYSTOLIC BLOOD PRESSURE: 102 MMHG | HEART RATE: 66 BPM | TEMPERATURE: 98.7 F | RESPIRATION RATE: 16 BRPM | OXYGEN SATURATION: 97 %

## 2025-03-06 DIAGNOSIS — L50.9 HIVES: Primary | ICD-10-CM

## 2025-03-06 RX ORDER — HYDROXYCHLOROQUINE SULFATE 200 MG/1
200 TABLET, FILM COATED ORAL
COMMUNITY
Start: 2025-02-04

## 2025-03-06 RX ORDER — PREDNISONE 50 MG/1
50 TABLET ORAL DAILY
Qty: 5 TABLET | Refills: 0 | Status: SHIPPED | OUTPATIENT
Start: 2025-03-06 | End: 2025-03-11

## 2025-03-06 RX ORDER — ROSUVASTATIN CALCIUM 5 MG/1
5 TABLET, COATED ORAL DAILY
COMMUNITY

## 2025-03-06 RX ORDER — AZELAIC ACID 0.15 G/G
GEL TOPICAL
COMMUNITY
Start: 2024-12-11

## 2025-03-06 RX ORDER — CETIRIZINE HYDROCHLORIDE 10 MG/1
10 TABLET ORAL DAILY
COMMUNITY

## 2025-03-06 RX ORDER — ESCITALOPRAM OXALATE 10 MG/1
10 TABLET ORAL NIGHTLY
COMMUNITY

## 2025-03-06 RX ORDER — ESTRADIOL 0.75 MG/.75G
GEL TOPICAL
COMMUNITY
Start: 2024-11-21

## 2025-03-06 RX ORDER — ESTRADIOL 0.1 MG/G
CREAM VAGINAL
COMMUNITY
Start: 2025-02-13

## 2025-03-06 RX ORDER — TIRZEPATIDE 5 MG/.5ML
INJECTION, SOLUTION SUBCUTANEOUS
COMMUNITY
Start: 2025-01-21

## 2025-03-06 RX ORDER — ALBUTEROL SULFATE 90 UG/1
INHALANT RESPIRATORY (INHALATION)
COMMUNITY
Start: 2024-12-11

## 2025-03-11 ENCOUNTER — OFFICE VISIT (OUTPATIENT)
Dept: INTERNAL MEDICINE CLINIC | Facility: CLINIC | Age: 55
End: 2025-03-11
Payer: COMMERCIAL

## 2025-03-11 VITALS
RESPIRATION RATE: 14 BRPM | DIASTOLIC BLOOD PRESSURE: 70 MMHG | HEART RATE: 70 BPM | SYSTOLIC BLOOD PRESSURE: 122 MMHG | HEIGHT: 63 IN | WEIGHT: 217.63 LBS | OXYGEN SATURATION: 96 % | TEMPERATURE: 98 F | BODY MASS INDEX: 38.56 KG/M2

## 2025-03-11 DIAGNOSIS — E89.0 POST-OPERATIVE HYPOTHYROIDISM: ICD-10-CM

## 2025-03-11 DIAGNOSIS — Z13.9 SCREENING DUE: ICD-10-CM

## 2025-03-11 DIAGNOSIS — Z87.19 HISTORY OF SMALL BOWEL OBSTRUCTION: ICD-10-CM

## 2025-03-11 DIAGNOSIS — L50.9 HIVES: ICD-10-CM

## 2025-03-11 DIAGNOSIS — G47.33 OSA ON CPAP: ICD-10-CM

## 2025-03-11 DIAGNOSIS — M35.9 UNDIFFERENTIATED CONNECTIVE TISSUE DISEASE (HCC): ICD-10-CM

## 2025-03-11 DIAGNOSIS — E66.812 CLASS 2 OBESITY WITHOUT SERIOUS COMORBIDITY WITH BODY MASS INDEX (BMI) OF 39.0 TO 39.9 IN ADULT, UNSPECIFIED OBESITY TYPE: Primary | ICD-10-CM

## 2025-03-11 PROCEDURE — 90471 IMMUNIZATION ADMIN: CPT | Performed by: INTERNAL MEDICINE

## 2025-03-11 PROCEDURE — 90677 PCV20 VACCINE IM: CPT | Performed by: INTERNAL MEDICINE

## 2025-03-11 PROCEDURE — 3078F DIAST BP <80 MM HG: CPT | Performed by: INTERNAL MEDICINE

## 2025-03-11 PROCEDURE — G2211 COMPLEX E/M VISIT ADD ON: HCPCS | Performed by: INTERNAL MEDICINE

## 2025-03-11 PROCEDURE — 3008F BODY MASS INDEX DOCD: CPT | Performed by: INTERNAL MEDICINE

## 2025-03-11 PROCEDURE — 99214 OFFICE O/P EST MOD 30 MIN: CPT | Performed by: INTERNAL MEDICINE

## 2025-03-11 PROCEDURE — 3074F SYST BP LT 130 MM HG: CPT | Performed by: INTERNAL MEDICINE

## 2025-03-11 RX ORDER — TIRZEPATIDE 5 MG/.5ML
5 INJECTION, SOLUTION SUBCUTANEOUS WEEKLY
Qty: 6 ML | Refills: 0 | Status: SHIPPED | OUTPATIENT
Start: 2025-03-11 | End: 2025-04-02

## 2025-03-11 NOTE — PROGRESS NOTES
Patient Office Visit    ASSESSMENT AND PLAN:   1. Class 2 obesity without serious comorbidity with body mass index (BMI) of 39.0 to 39.9 in adult, unspecified obesity type  Note: Start weight was 226 pounds.  Will continue with Zepbound and we will see if it is covered under insurance due to sleep apnea.  She states that she was able to tolerate the medications  - Tirzepatide-Weight Management (ZEPBOUND) 5 MG/0.5ML Subcutaneous Solution Auto-injector; Inject 5 mg into the skin once a week for 4 doses.  Dispense: 6 mL; Refill: 0    2. Screening due  - MMR Panel([facility]); Future    3. JEOVANY on CPAP  Note: AHI was about 12.2.  Continue with CPAP machine    4. Post-operative hypothyroidism  Note: Continue levothyroxine  - TSH W Reflex To Free T4 [E]    5. Hives  Note: Recommended to try Xyzal or Claritin and discussed that the hydroxychloroquine could have caused the reaction and she should call the rheumatologist    6. Undifferentiated connective tissue disease (HCC)  Note: As above    7. History of small bowel obstruction  Note: Recently was hospitalized and was found to have a small bowel obstruction due to adhesions in the past.  She is having regular bowel movements right now    Return to clinic in 3 months for a weight check  Prevnar 20 was provided today    Patient/Caregiver Education: Patient/Caregiver Education: There are no barriers to learning. Medical education done. Outcome: Patient verbalizes understanding. Patient is notified to call with any questions, complications, allergies, or worsening or changing symptoms.  Patient is to call with any side effects or complications from the treatments as a result of today.      Reviewed Past Medical History and   Patient Active Problem List   Diagnosis    History of hysterectomy    History of Graves' disease    Family history of heart disease    Anxiety    Class 2 obesity without serious comorbidity with body mass index (BMI) of 39.0 to 39.9 in adult     JEOVANY on CPAP    Post-operative hypothyroidism    Atrial tachycardia (HCC)    Familial hypercholesterolemia    Sjogren's syndrome with keratoconjunctivitis sicca (HCC)    Undifferentiated connective tissue disease (HCC)    History of small bowel obstruction       Orders Placed This Encounter   Procedures    MMR Panel([facility] Panel)     Standing Status:   Future     Standing Expiration Date:   3/11/2026     Order Specific Question:   Release to patient     Answer:   Immediate    TSH W Reflex To Free T4 [E]     Order Specific Question:   Release to patient     Answer:   Immediate    Prevnar 20 (PCV20) [60041]     Requested Prescriptions     Signed Prescriptions Disp Refills    Tirzepatide-Weight Management (ZEPBOUND) 5 MG/0.5ML Subcutaneous Solution Auto-injector 6 mL 0     Sig: Inject 5 mg into the skin once a week for 4 doses.         Cydney Ovalles DO  CC:  Chief Complaint   Patient presents with    Follow - Up         HPI:   Sonja Hines is a 54-year-old female who presents for the following concerns    Obesity: She would like to continue the Zepbound as she has been losing weight on it.  She was paying out-of-pocket for it.  Sleep apnea: She does have high AHI and has been using her CPAP machine  Sjogren's/connective tissue disease: She recently started Plaquenil.  She was recently in the urgent care for hives.  She has taken prednisone as well as Zyrtec with some relief but she still has the itching at nighttime.  In regards to the hives she had it last year and she saw the allergist but at that time no further testing was recommended.  She is planning on seeing him again  Hypothyroid: Stable    Past Medical History:    Allergic rhinitis    Anxiety    Not formally diagnosed    Graves disease    Hyperthyroidism    Graves disease. Had AJ now on Synthoid    Hypothyroidism    After AJ    Sleep apnea       Past Surgical History:   Procedure Laterality Date    D & c      Freeing bowel  adhesion,enterolysis N/A 2024    Hysterectomy  2018    partial hystero.      ,     Skin surgery      Tonsillectomy         Social History:  Social History     Socioeconomic History    Marital status:    Tobacco Use    Smoking status: Never     Passive exposure: Never    Smokeless tobacco: Never   Vaping Use    Vaping status: Never Used   Substance and Sexual Activity    Alcohol use: Yes     Comment: Occasionally when out to dinner with friends    Drug use: Not Currently     Types: Cannabis     Comment: Haven’t tried or used in well over a year. Tried a gummy    Sexual activity: Yes     Partners: Male   Other Topics Concern    Caffeine Concern No    Exercise Yes     Comment: 1-3x per week    Seat Belt Yes    Special Diet Yes     Comment: Avoid dairy, lactose intolerance    Stress Concern No    Weight Concern Yes     Social Drivers of Health     Food Insecurity: No Food Insecurity (2024)    Received from [facility]    Food Insecurity     Currently or in the past 3 months, have you worried your food would run out before you had money to buy more?: No     In the past 12 months, have you run out of food or been unable to get more?: No   Transportation Needs: No Transportation Needs (2024)    Received from [facility]    Transportation Needs     Currently or in the past 3 months, has lack of transportation kept you from medical appointments, getting food or medicine, or providing care to a family member?: Unrecognized value     Medical Transportation Needs?: No   Housing Stability: Unknown (2024)    Received from [facility]    Housing Stability Vital Sign     Unable to Pay for Housing in the Last Year: No     Family History:  Family History   Problem Relation Age of Onset    Heart Disorder Father     Diabetes Father     Hypertension Father     Heart Disease Father     Heart Disorder Mother     Hypertension Mother      Heart Disease Mother     Hypertension Sister      Allergies:  Allergies[1]  Current Meds:  Medications Ordered Prior to Encounter[2]      REVIEW OF SYSTEMS   Constitutional: no fatigue normal energy no weight changes   HENT: normal sinuses and no mouth issues   Eyes: . normal vision no eye pain   Respiratory: normal respirations no cough   Cardiovascular: no CP, or palpitations   Gastrointestinal: normal bowels and no abd pains   Genitourinary:  normal urination no hematuria, no frequency   Musculoskeletal: no pains in arms/legs, normal range of motion   Skin: As above  Neurological:  no weakness, no numbness, normal gait   Hematological:  no bruises or bleeding   Psychiatric/Behavioral: normal mood no anxiety normal behavior     /70 (BP Location: Left arm, Patient Position: Sitting, Cuff Size: adult)   Pulse 70   Temp 97.7 °F (36.5 °C) (Temporal)   Resp 14   Ht 5' 3\" (1.6 m)   Wt 217 lb 9.6 oz (98.7 kg)   SpO2 96%   BMI 38.55 kg/m²     PHYSICAL EXAM:   Constitutional: Vital signs reviewed as noted, well developed, in no acute distress.   HENT: NCAT  Eyes: pupils reactive bilaterally  Cardiovascular: nl s1 s2 no m/r/g  Pulmonary/Chest: CTA bilaterally with no wheezes  Extremities: no pedal edema   Neurological:  no weakness in UE and LE, reflexes are normal  Skin: Erythematous rash noted on the arms  Psychiatric:normal mood              [1]   Allergies  Allergen Reactions    Azithromycin HIVES    Erythromycin OTHER (SEE COMMENTS) and UNKNOWN    Penicillins HIVES and OTHER (SEE COMMENTS)    Sulfa Antibiotics HIVES    Amoxicillin DIZZINESS    Milk Protein Extract OTHER (SEE COMMENTS)   [2]   Current Outpatient Medications on File Prior to Visit   Medication Sig Dispense Refill    estradiol (ESTRACE) 0.1 MG/GM Vaginal Cream Place 1 g vaginally every evening for 7 days, THEN 1 g twice a week. 42.5 g 3    hydroxychloroquine 200 MG Oral Tab Take 1 tablet (200 mg total) by mouth 2 (two) times daily. 180 tablet  2    Estradiol (DIVIGEL) 0.75 MG/0.75GM Transdermal Gel Place 1 packet onto the skin daily. 90 each 2    albuterol 108 (90 Base) MCG/ACT Inhalation Aero Soln Inhale 1 puff into the lungs every 6 (six) hours as needed. 18 g 0    ESCITALOPRAM 10 MG Oral Tab TAKE 1 TABLET(10 MG) BY MOUTH DAILY 90 tablet 4    rosuvastatin 5 MG Oral Tab Take 1 tablet (5 mg total) by mouth every other day.      ergocalciferol 1.25 MG (99285 UT) Oral Cap       metoprolol succinate ER 25 MG Oral Tablet 24 Hr Take 0.5 tablets (12.5 mg total) by mouth daily. Half of 25mg      SYNTHROID 125 MCG Oral Tab        No current facility-administered medications on file prior to visit.

## 2025-03-11 NOTE — PATIENT INSTRUCTIONS
Continue to exercise at least 150 minutes a week and Eat a plant based diet     Please have blood work done    Please call your rheumatologist and let him know about the rash as the rash could be from the Plaquenil    You can take Claritin or Xyzal while you are traveling    You can follow-up with the allergist after you return from [place]    I have sent the Zepbound and lets see if it is approved    See me back in 3 months for weight check    You received the pneumonia vaccine today and it may cause soreness of the arm for 24 hours and may take tylenol 1000 mg once

## 2025-04-04 ENCOUNTER — PATIENT MESSAGE (OUTPATIENT)
Dept: INTERNAL MEDICINE CLINIC | Facility: CLINIC | Age: 55
End: 2025-04-04

## 2025-04-07 RX ORDER — TIRZEPATIDE 7.5 MG/.5ML
7.5 INJECTION, SOLUTION SUBCUTANEOUS WEEKLY
Qty: 2 ML | Refills: 0 | Status: SHIPPED | OUTPATIENT
Start: 2025-04-07 | End: 2025-04-29

## 2025-04-07 NOTE — TELEPHONE ENCOUNTER
Please see MCM. Insurance denied Zepbound 5mg for sleep apnea as dosage is \"not in benefit\". Patient asking if PCP thinks increasing dose may result in approval.       Order pended, please sign if appropriate.

## 2025-04-19 LAB — TSH W/REFLEX TO FT4: 3.3 MIU/L

## 2025-05-07 NOTE — TELEPHONE ENCOUNTER
Protocol NONE    Requesting: Tirzepatide-Weight Management (ZEPBOUND) 7.5 MG/0.5ML Subcutaneous Solution Auto-injector     LOV: 3/11/25  RTC: 3 MONTHS  Filled: 4/7/25 2ML 0 REFILL  Recent Labs:     Upcoming OV   Future Appointments   Date Time Provider Department Center   5/13/2025  4:00 PM Xiomara Sam MD [facility]   5/29/2025  8:00 AM Renay Sanchez APRN [facility]   6/2/2025 12:45 PM Annabelle Mena DO [facility] [place]   6/3/2025 12:30 PM Cydney Ovalles DO [facility] [place]   8/18/2025  1:15 PM Vel Tatum MD [facility] [place]

## 2025-05-08 RX ORDER — TIRZEPATIDE 7.5 MG/.5ML
7.5 INJECTION, SOLUTION SUBCUTANEOUS WEEKLY
Qty: 2 ML | Refills: 0 | Status: SHIPPED | OUTPATIENT
Start: 2025-05-08

## 2025-05-13 ENCOUNTER — OFFICE VISIT (OUTPATIENT)
Dept: OBGYN CLINIC | Facility: CLINIC | Age: 55
End: 2025-05-13

## 2025-05-13 VITALS
HEIGHT: 63 IN | WEIGHT: 213 LBS | DIASTOLIC BLOOD PRESSURE: 80 MMHG | SYSTOLIC BLOOD PRESSURE: 115 MMHG | BODY MASS INDEX: 37.74 KG/M2

## 2025-05-13 DIAGNOSIS — Z79.890 POST-MENOPAUSE ON HRT (HORMONE REPLACEMENT THERAPY): ICD-10-CM

## 2025-05-13 DIAGNOSIS — Z12.31 ENCOUNTER FOR SCREENING MAMMOGRAM FOR BREAST CANCER: ICD-10-CM

## 2025-05-13 DIAGNOSIS — Z01.419 ENCOUNTER FOR WELL WOMAN EXAM WITH ROUTINE GYNECOLOGICAL EXAM: Primary | ICD-10-CM

## 2025-05-13 PROCEDURE — 99396 PREV VISIT EST AGE 40-64: CPT | Performed by: OBSTETRICS & GYNECOLOGY

## 2025-05-13 PROCEDURE — 3008F BODY MASS INDEX DOCD: CPT | Performed by: OBSTETRICS & GYNECOLOGY

## 2025-05-13 PROCEDURE — 3079F DIAST BP 80-89 MM HG: CPT | Performed by: OBSTETRICS & GYNECOLOGY

## 2025-05-13 PROCEDURE — 3074F SYST BP LT 130 MM HG: CPT | Performed by: OBSTETRICS & GYNECOLOGY

## 2025-05-13 RX ORDER — ESTRADIOL 0.1 MG/G
CREAM VAGINAL
Qty: 42.5 G | Refills: 3 | Status: SHIPPED | OUTPATIENT
Start: 2025-05-13

## 2025-05-13 RX ORDER — ESCITALOPRAM OXALATE 10 MG/1
10 TABLET ORAL DAILY
Qty: 90 TABLET | Refills: 4 | Status: SHIPPED | OUTPATIENT
Start: 2025-05-13

## 2025-05-13 RX ORDER — ESTRADIOL 0.75 MG/.75G
1 GEL TOPICAL DAILY
Qty: 90 EACH | Refills: 2 | Status: SHIPPED | OUTPATIENT
Start: 2025-05-13

## 2025-05-13 NOTE — PROGRESS NOTES
[facility]  Obstetrics and Gynecology  Gynecology Visit    Chief Complaint   Patient presents with    Annual     Reviewed Preventative/Wellness form with patient.           Sonja Hines is a 54 year old female who presents for annual exam.    LMP: Hysterectomy.    Menses regular: none.    Menstrual flow normal: non3.    Birth control or HRT:  HRT.   Refill yes  Last Pap Smear: .  Any history of abnormal paps: normal   Last MM25  Any Medication Refills needed today?: Estrace, Divigel  Sleep: 6 hours.    Diet: balanced.    Exercise: yes.   Screening labs/Blood work today: n/a.     Colonoscopy (if over 44 y/o): cologuard.   Gardasil:(age 9-44 y/o) n/a.   Genetic Cancer screen (if indicated): n/a.   Flu (Aug-April): declines.TDAP (every 10 years) .      Additional Problems/concerns: Bright red spotting upon wiping once in April .      Next Appt: scheduled    Immunization History   Administered Date(s) Administered    Covid-19 Vaccine DigitalTangible (J&J) 0.5ml 2021    Covid-19 Vaccine Moderna 50 Mcg/0.25 Ml 02/10/2022    FLULAVAL 6 months & older 0.5 ml Prefilled syringe (90571) 11/15/2022    FLUZONE 6 months and older PFS 0.5 ml (59330) 2020, 2023    Influenza 2021    Pneumococcal Conjugate PCV20 2025    TDAP 2021    Zoster Vaccine Recombinant Adjuvanted (Shingrix) 2024, 2024       Medications - Current[1]    Allergies[2]    OB History    Para Term  AB Living   2 2 2 0 0 0   SAB IAB Ectopic Multiple Live Births   0 0 0 0 0      # Outcome Date GA Lbr Efren/2nd Weight Sex Type Anes PTL Lv   2 Term 03/10/00    M Vag-Spont None     1 Term 96    M Vag-Spont EPI         Gyn History       No data recorded       No data to display                    Past Medical History[3]    Past Surgical History[4]    Family History[5]     Tobacco  Allergies  Meds  Med Hx  Surg Hx  Fam Hx  Soc Hx        Social History     Socioeconomic History    Marital  status:      Spouse name: Not on file    Number of children: Not on file    Years of education: Not on file    Highest education level: Not on file   Occupational History    Not on file   Tobacco Use    Smoking status: Never     Passive exposure: Never    Smokeless tobacco: Never   Vaping Use    Vaping status: Never Used   Substance and Sexual Activity    Alcohol use: Yes     Comment: Occasionally when out to dinner with friends    Drug use: Not Currently     Types: Cannabis     Comment: Haven’t tried or used in well over a year. Tried a gummy    Sexual activity: Yes     Partners: Male   Other Topics Concern    Caffeine Concern No    Exercise Yes     Comment: 1-3x per week    Seat Belt Yes    Special Diet Yes     Comment: Avoid dairy, lactose intolerance    Stress Concern No    Weight Concern Yes   Social History Narrative    Not on file     Social Drivers of Health     Food Insecurity: No Food Insecurity (5/13/2025)    NCSS - Food Insecurity     Worried About Running Out of Food in the Last Year: No     Ran Out of Food in the Last Year: No   Transportation Needs: No Transportation Needs (5/13/2025)    NCSS - Transportation     Lack of Transportation: No   Stress: Not on file   Housing Stability: Not At Risk (5/13/2025)    NCSS - Housing/Utilities     Has Housing: Yes     Worried About Losing Housing: No     Unable to Get Utilities: No       /80   Ht 5' 3\" (1.6 m)   Wt 213 lb (96.6 kg)   BMI 37.73 kg/m²   Wt Readings from Last 3 Encounters:   05/13/25 213 lb (96.6 kg)   03/11/25 217 lb 9.6 oz (98.7 kg)   02/13/25 212 lb 8.4 oz (96.4 kg)     Health Maintenance   Topic Date Due    COVID-19 Vaccine (3 - 2024-25 season) 09/01/2024    Annual Physical  09/30/2025    Influenza Vaccine (Season Ended) 10/01/2025    Mammogram  01/07/2026    Colorectal Cancer Screening  05/16/2027    DTaP,Tdap,and Td Vaccines (2 - Td or Tdap) 03/03/2031    Annual Depression Screening  Completed    Pneumococcal Vaccine: 50+  Years  Completed    Zoster Vaccines  Completed    Meningococcal B Vaccine  Aged Out       Review of Systems   General: Present- Feeling well. Not Present- Chills, Fever, Weight Gain and Weight Loss.  HEENT: Not Present- Headache and Sore Throat.  Respiratory: Not Present- Cough, Difficulty Breathing, Hemoptysis and Sputum Production.  Breast: Not Present- Breast Mass, Breast Pain and Nipple Discharge.  Cardiovascular: Not Present- Chest Pain, Elevated Blood Pressure, Fainting / Blacking Out and Shortness of Breath.  Gastrointestinal: Not Present- Bloody Stool, Constipation, Diarrhea, Heartburn, Nausea and Vomiting.  Female Genitourinary: Not Present- Discharge, Dysuria, Frequency, Incontinence, Pelvic Pain and Urgency.  Musculoskeletal: Not Present- Leg Cramps and Swelling of Extremities.  Neurological: Not Present- Dizziness and Headaches.  Psychiatric: Not Present- Anxiety and Depression.  Endocrine: Not Present- Appetite Changes, Hair Changes and Thyroid Problems.  Hematology: Not Present- Blood Clots, Easy Bruising and Excessive bleeding.  All other systems negative     Physical Exam   The physical exam findings are as follows:     General   Mental Status - Alert. General Appearance - Cooperative. Orientation - Oriented X4. Build & Nutrition - Well nourished.    Head and Neck  Thyroid   Gland Characteristics - normal size and consistency.    Chest and Lung Exam   Inspection:   Chest Wall: - Normal.  Palpation: - Palpation normal.  Auscultation:   Breath sounds: - Normal.  Adventitious sounds: - No Adventitious sounds.      Breast   Nipples: Characteristics - Bilateral - Normal. Discharge - Bilateral - None.  Breast - Bilateral - Symmetric.          Cardiovascular   Auscultation: Rhythm - Regular. Heart Sounds - Normal heart sounds.  Murmurs & Other Heart Sounds: Auscultation of the heart reveals - No Murmurs.    Abdomen   Inspection: Inspection of the abdomen reveals - No Hernias. Incisional scars - No  incisional scars.  Palpation/Percussion: Palpation and Percussion of the abdomen reveal - Non Tender and No Palpable abdominal masses.  Liver: - Normal.    Female Genitourinary     External Genitalia   Perineum - Normal. Bartholin's Gland - Bilateral - Normal. Clitoris - Normal.  Introitus: Characteristics - No Cystocele, Enterocele or Rectocele. Discharge - None.  Labia Majora: Lesions - Bilateral - None. Characteristics - Bilateral - Normal.  Urethra: Characteristics - Normal. Discharge - None.  [place] Gland - Bilateral - Normal.  Vulva: Lesions - None.    Speculum & Bimanual   Vagina:   Vaginal Wall: - Normal.  Vaginal Lesions - None. Vaginal Mucosa - Normal.  Cervix: Characteristics - Surgically absent.  Uterus: Characteristics - Surgically absent.  Adnexa: Characteristics - Bilateral - Normal. Masses - No Adnexal Masses.  [place]et [place]: Main patient complaints - None.          Rectal   Anorectal Exam: External - normal external exam.    Peripheral Vascular   Upper Extremity: Inspection - Bilateral - Normal - No Clubbing, No Cyanosis, No Edema, Pulses Intact.  Palpation: - Pulses bilaterally normal.  Lower Extremity: Inspection - Bilateral - Inspection Normal.  Palpation: Edema - Bilateral - No edema.    Neurologic   Mental Status: - Normal.     Lymphatic  General Lymphatics   Description - Normal .    1. Encounter for well woman exam with routine gynecological exam    2. Encounter for screening mammogram for breast cancer    3. Post-menopause on HRT (hormone replacement therapy)                     [1]   Current Outpatient Medications:     ZEPBOUND 7.5 MG/0.5ML Subcutaneous Solution Auto-injector, ADMINISTER 7.5 MG UNDER THE SKIN 1 TIME A WEEK FOR 4 DOSES, Disp: 2 mL, Rfl: 0    hydroxychloroquine 200 MG Oral Tab, Take 1 tablet (200 mg total) by mouth 2 (two) times daily., Disp: 180 tablet, Rfl: 2    albuterol 108 (90 Base) MCG/ACT Inhalation Aero Soln, Inhale 1 puff into the lungs every 6 (six) hours as needed.,  Disp: 18 g, Rfl: 0    rosuvastatin 5 MG Oral Tab, Take 1 tablet (5 mg total) by mouth every other day., Disp: , Rfl:     ergocalciferol 1.25 MG (61578 UT) Oral Cap, , Disp: , Rfl:     metoprolol succinate ER 25 MG Oral Tablet 24 Hr, Take 0.5 tablets (12.5 mg total) by mouth daily. Half of 25mg, Disp: , Rfl:     SYNTHROID 125 MCG Oral Tab, , Disp: , Rfl:     estradiol (ESTRACE) 0.1 MG/GM Vaginal Cream, Place 1 g vaginally every evening for 7 days, THEN 1 g twice a week., Disp: 42.5 g, Rfl: 3    Estradiol (DIVIGEL) 0.75 MG/0.75GM Transdermal Gel, Place 1 packet onto the skin daily., Disp: 90 each, Rfl: 2    ESCITALOPRAM 10 MG Oral Tab, TAKE 1 TABLET(10 MG) BY MOUTH DAILY, Disp: 90 tablet, Rfl: 4  [2]   Allergies  Allergen Reactions    Azithromycin HIVES    Erythromycin OTHER (SEE COMMENTS) and UNKNOWN    Penicillins HIVES and OTHER (SEE COMMENTS)    Sulfa Antibiotics HIVES    Amoxicillin DIZZINESS    Milk Protein Extract OTHER (SEE COMMENTS)   [3]   Past Medical History:   Allergic rhinitis    Anxiety    Not formally diagnosed    Graves disease    Hyperthyroidism    Graves disease. Had AJ now on Synthoid    Hypothyroidism    After AJ    Sleep apnea   [4]   Past Surgical History:  Procedure Laterality Date    D & c      Freeing bowel adhesion,enterolysis N/A 2024    Hysterectomy  2018    partial hystero.      ,     Skin surgery      Tonsillectomy     [5]   Family History  Problem Relation Age of Onset    Heart Disorder Father     Diabetes Father     Hypertension Father     Heart Disease Father     Heart Disorder Mother     Hypertension Mother     Heart Disease Mother     Hypertension Sister

## 2025-05-29 ENCOUNTER — PATIENT MESSAGE (OUTPATIENT)
Dept: INTERNAL MEDICINE CLINIC | Facility: CLINIC | Age: 55
End: 2025-05-29

## 2025-05-29 ENCOUNTER — OFFICE VISIT (OUTPATIENT)
Dept: INTERNAL MEDICINE CLINIC | Facility: CLINIC | Age: 55
End: 2025-05-29
Payer: COMMERCIAL

## 2025-05-29 VITALS
DIASTOLIC BLOOD PRESSURE: 70 MMHG | BODY MASS INDEX: 36.57 KG/M2 | HEART RATE: 82 BPM | HEIGHT: 63.5 IN | RESPIRATION RATE: 16 BRPM | WEIGHT: 209 LBS | SYSTOLIC BLOOD PRESSURE: 110 MMHG

## 2025-05-29 DIAGNOSIS — G47.33 OSA ON CPAP: ICD-10-CM

## 2025-05-29 DIAGNOSIS — E66.01 CLASS 2 SEVERE OBESITY WITH SERIOUS COMORBIDITY AND BODY MASS INDEX (BMI) OF 36.0 TO 36.9 IN ADULT, UNSPECIFIED OBESITY TYPE (HCC): ICD-10-CM

## 2025-05-29 DIAGNOSIS — F41.9 ANXIETY: ICD-10-CM

## 2025-05-29 DIAGNOSIS — Z86.39 HISTORY OF MORBID OBESITY: ICD-10-CM

## 2025-05-29 DIAGNOSIS — Z51.81 ENCOUNTER FOR THERAPEUTIC DRUG MONITORING: Primary | ICD-10-CM

## 2025-05-29 DIAGNOSIS — I47.19 ATRIAL TACHYCARDIA (HCC): ICD-10-CM

## 2025-05-29 DIAGNOSIS — E66.812 CLASS 2 SEVERE OBESITY WITH SERIOUS COMORBIDITY AND BODY MASS INDEX (BMI) OF 36.0 TO 36.9 IN ADULT, UNSPECIFIED OBESITY TYPE (HCC): ICD-10-CM

## 2025-05-29 DIAGNOSIS — E78.01 FAMILIAL HYPERCHOLESTEROLEMIA: ICD-10-CM

## 2025-05-29 PROCEDURE — 3008F BODY MASS INDEX DOCD: CPT | Performed by: NURSE PRACTITIONER

## 2025-05-29 PROCEDURE — 99205 OFFICE O/P NEW HI 60 MIN: CPT | Performed by: NURSE PRACTITIONER

## 2025-05-29 PROCEDURE — 3074F SYST BP LT 130 MM HG: CPT | Performed by: NURSE PRACTITIONER

## 2025-05-29 PROCEDURE — 3078F DIAST BP <80 MM HG: CPT | Performed by: NURSE PRACTITIONER

## 2025-05-29 RX ORDER — TIRZEPATIDE 10 MG/.5ML
10 INJECTION, SOLUTION SUBCUTANEOUS WEEKLY
Qty: 2 ML | Refills: 3 | Status: SHIPPED | OUTPATIENT
Start: 2025-05-29

## 2025-05-29 NOTE — PROGRESS NOTES
HISTORY OF PRESENT ILLNESS  Chief Complaint   Patient presents with    Weight Problem     Dr polo referral. Currently zepbound 7.5. walking for exercise       Sonja Hines is a 54 year old female new to our office today for initiation of medical weight loss program, referred by PCP.  Patient presents today with c/o excess weight started in college.    Reason/goal for weight loss: Reduce overall pain and increase mobility.    Previous weight loss efforts in the past: multiple diets, Phentermine with SEs, Topamax, and currently on Zepbound 7.5 mg weekly - paying OOP for the past 1 year.    Past 6 months lifestyle interventions: yes, regular exercise    Reviewed [facility] patient contract. Readiness for Lifestyle change: 8/10, Interest in Medication: 9/10, Bariatric surgery interest: 0/10.    Barriers to weight loss: emotional eating    Wt Readings from Last 6 Encounters:   05/29/25 209 lb (94.8 kg)   05/13/25 213 lb (96.6 kg)   03/11/25 217 lb 9.6 oz (98.7 kg)   02/13/25 212 lb 8.4 oz (96.4 kg)   02/04/25 212 lb (96.2 kg)   09/30/24 219 lb (99.3 kg)          Social hx and lifestyle reviewed:    How many meals do you eat out per week: not reported  Who is the primary cook in your home: patient    Please respond to the questions regarding your previous weight loss    How did you hear about the [facility]? Primary Care Provider   Previous weight loss efforts in the past/medication(s): Phentermine, Topamax, Zepbound   Eating behaviors/patterns that have been barriers to weight loss success in the past: Emotional, numbing eating. Fear of missing out, greed   Please respond to the questions regarding a 24 hour food journal.  Include the average time you ate and the quantity/food preparation method.    List foods, qty and prep for breakfast: 9a. 170 g lactose free yogurt, 70g blueberries, 2 scoops collagen   List foods, qty and prep for lunch. 4-5oz chicken, salad greens, 1/2 artichoke, celery, chickpea, beet, dried  cranberries, slivered almonds, sunflower seeds, salt and olive oil   List foods, qty and prep for dinner. 3-4 oz white rice, 4oz protein, 5-6 oz vegetable   List foods, qty and prep for snacks. Apple, 1 oz cheddar   List the types and qty of fluids consumed Coffee w/2 sugars, 2% milk, water   Please respond to the questions regarding lifestyle.    Tobacco use: No   Alcohol use: How many servings per week? 0   Supplements taken on a regular basis include: Calcium, omega fish oil, vitamin D, vitamin c   Please respond to the questions regarding exercise/activity    How many days per week are you active or exercise 2   What type of activities: Walk, just started lifting/ strength training again.   Perceived level of exertion on a scale of 1-5, with 5 being very intense: 3   Average stress level on a scale of 1-10, with 10 being extremely stressed: 5   How do you cope with stress: Box breathing, journaling, walk   Please respond to the questions regarding sleep    How many hours of uninterrupted sleep do you get a night: 6   How many times do you wake up in the night: 0   Do you feel rested in the morning: No   Do you snore: Yes   Do you have sleep apnea: Yes   Do you use: CPAP     Work: hybrid work  Marital status:   Support: support    MEDICAL HISTORY  PMH reviewed:   Cardiac disorders: palpitations, dyslipidemia  Depression/anxiety: anxiety  Glaucoma: negative  Kidney stones: negative  Eating disorder: negative  Migraines: negative  Seizures: negative  Joint-related conditions: LBP  Liver disease: negative  Renal disease: negative  Diabetes: negative  Thyroid disease: hypothyroidism  Constipation: negative  Other pertinent hx: n/a  Sleep Apnea hx: JEOVANY on CPAP - no SS to review  Cancer hx: negative  Cholecystectomy and/or gallstones: negative  Family or personal history of Pancreatic issues / Medullary Thyroid Cancer/MENS 2: negative  History of bariatric surgery: negative    [facility] reviewed    REVIEW OF  SYSTEMS  GENERAL: feels well otherwise  SKIN: denies any rashes to skin folds  HEENT: snoring- yes  LUNGS: denies shortness of breath with exertion  CARDIOVASCULAR: denies chest pain on exertion, denies palpitations or pedal edema  GI: denies abdominal pain.  No N/V/D/C  MUSCULOSKELETAL: see above  NEURO: denies headaches  PSYCH: denies change in behavior or mood, denies feeling sad or depressed. BED screen- negative.    EXAM    MBSAQIP Information  Patient type: Non-Surgical   Initial Body Fat %: 43.8  Today's Body Fat Female: 30.53 Change in Body   Fat %: 13.27   Date of Initial Weight: 5/29/25 Initial Weight: 209 Today's Weight: 209   Weightloss to Date: 0   Weightloss Percentage: 0 5% Goal: 10.45 10% Goal: 20.9    Initial BMI: 36.3 Today's BMI: 36.44 Change in BMI: -0.14    Neck Circumference: 14  Waist Circumference Female: 38     Female Fat Mass: 63.81 Female Lean Mass: 145.19      Have any comorbidities improved since the last visit?   Hypertension DM 2 Dyslipidemia Osteoarthritis Sleep Apnea                 /70   Pulse 82   Resp 16   Ht 5' 3.5\" (1.613 m)   Wt 209 lb (94.8 kg)   BMI 36.44 kg/m² ,   Percent body fat: F >32%: 43.8%. VF: 14. Muscle Mass: 13%, WC: 38 inches.  GENERAL: well developed, well nourished, in no apparent distress, obese  SKIN: warm, pink, dry without rashes to exposed area  EYES: conjunctiva pink, sclera non icteric, PERRLA  HEENT: atraumatic, normocephalic  NECK: supple, non tender, no adenopathy, no thyromegaly   LUNGS: CTA in all fields, breathing non labored  CARDIO: RRR without murmur, normal S1 and S2 without clicks or gallops, no pedal edema.   GI: +BS, soft, no masses, HSM or tenderness  MUSCULOSKELETAL: grossly intact  NEURO: Oriented times three  PSYCH: pleasant, cooperative, normal mood and affect    Lab Results   Component Value Date    WBC 9.8 03/25/2024    RBC 4.72 01/30/2024    HGB 14.2 01/30/2024    HCT 43.5 01/30/2024    MCV 92.2 01/30/2024    MCH 30.1  01/30/2024    MCHC 32.6 01/30/2024    RDW 12.5 01/30/2024     03/25/2024     Lab Results   Component Value Date    AST 17 03/25/2024    ALT 19 03/25/2024    TP 6.8 08/09/2024     03/25/2024    K 4.2 03/25/2024     No results found for: \"EAG\", \"A1C\"  No results found for: \"CHOLEST\", \"TRIG\", \"HDL\", \"LDL\", \"VLDL\", \"TCHDLRATIO\", \"NONHDLC\", \"CHOLHDLRATIO\", \"CALCNONHDL\"  Lab Results   Component Value Date    TSH 1.35 05/10/2024    TSHT4 3.30 04/18/2025     Lab Results   Component Value Date    VITB12 935 05/10/2024     Lab Results   Component Value Date    VITD 62 05/10/2024       Medications Ordered Prior to Encounter[1]    ASSESSMENT  Initial Weight Data and Goal Weight Loss:  Weight Calculations  Initial Weight: 209 lbs  Initial Weight Date: 05/29/25  Today's Weight: 209 lbs  5% Goal: 10.45  10% Goal: 20.9  Total Weight Loss: 0 lbs    Diagnoses and all orders for this visit:    Encounter for therapeutic drug monitoring  -     Vitamin B12  -     Lipid Panel  -     Hemoglobin A1C  -     Comp Metabolic Panel (14)  -     CBC With Differential With Platelet  -     Tirzepatide-Weight Management (ZEPBOUND) 10 MG/0.5ML Subcutaneous Solution Auto-injector; Inject 10 mg into the skin once a week.  -     VITAMIN D, 25-HYDROXY [[id number]][Q]    Class 2 severe obesity with serious comorbidity and body mass index (BMI) of 36.0 to 36.9 in adult, unspecified obesity type (HCC)  - Increase Zepbound as directed  - Reviewed balanced plate nutrition with focus on whole food, regular meals daily that include protein and produce and eliminating/reducing late night eating.  - Counseled on the 4 Pillars of health (sleep, stress, nutrition and fitness).  - Reviewed weight synopsis in EMR  -     Vitamin B12  -     Lipid Panel  -     Hemoglobin A1C  -     Comp Metabolic Panel (14)  -     CBC With Differential With Platelet  -     OP REFERRAL TO DIETITIAN [facility] ([facility] USE ONLY)  -     Tirzepatide-Weight Management (ZEPBOUND) 10 MG/0.5ML  Subcutaneous Solution Auto-injector; Inject 10 mg into the skin once a week.  -     VITAMIN D, 25-HYDROXY [[id number]][Q]    Familial hypercholesterolemia  -     Vitamin B12  -     Lipid Panel  -     Hemoglobin A1C  -     Comp Metabolic Panel (14)  -     CBC With Differential With Platelet  -     OP REFERRAL TO DIETITIAN [facility] ([facility] USE ONLY)  -     Tirzepatide-Weight Management (ZEPBOUND) 10 MG/0.5ML Subcutaneous Solution Auto-injector; Inject 10 mg into the skin once a week.  -     VITAMIN D, 25-HYDROXY [[id number]][Q]    JEOVANY on CPAP  - Zepbound  - Request sleep study results  -     Vitamin B12  -     Lipid Panel  -     Hemoglobin A1C  -     Comp Metabolic Panel (14)  -     CBC With Differential With Platelet  -     OP REFERRAL TO DIETITIAN [facility] ([facility] USE ONLY)  -     Tirzepatide-Weight Management (ZEPBOUND) 10 MG/0.5ML Subcutaneous Solution Auto-injector; Inject 10 mg into the skin once a week.  -     VITAMIN D, 25-HYDROXY [[id number]][Q]    Anxiety  -     Vitamin B12  -     Comp Metabolic Panel (14)  -     CBC With Differential With Platelet  -     VITAMIN D, 25-HYDROXY [[id number]][Q]    Atrial tachycardia (HCC)  -     CBC With Differential With Platelet    History of morbid obesity  Comments:  Baseline BMI: 40.11 (2/8/24)  Orders:  -     Vitamin B12  -     Lipid Panel  -     Hemoglobin A1C  -     Comp Metabolic Panel (14)  -     CBC With Differential With Platelet  -     Tirzepatide-Weight Management (ZEPBOUND) 10 MG/0.5ML Subcutaneous Solution Auto-injector; Inject 10 mg into the skin once a week.  -     VITAMIN D, 25-HYDROXY [[id number]][Q]        PLAN  Medication use and side effects reviewed with patient.  Medication contraindications: phentermine d/t SEs of palpitations  Follow up with dietitian and psychologist as recommended.  Discussed the role of sleep and stress in weight management.  Labs orders as above.  Counseled on comprehensive weight loss plan including attention to nutrition, exercise and behavior/stress  management for success. See patient instruction below for more details.  Reviewed previous labs in EMR/Care Everywhere  Weight Loss Contract reviewed and signed.    Patient Instructions   Welcome to the [facility]...your Lifestyle Renovation begins now!  Thank you for placing your trust in our health care team, I look forward to working with you along this journey to better health!    Next steps:     1.  Call our office at [phone] to schedule a personal nutrition consultation with one of our registered dieticians, Norman or Lazara. Bring along your food journal (3 days minimum). See journal options below.  2.  Complete fasting (10-12 hours, water only) labs at [facility] lab site prior to next office visit. Lab results will be communicated via FairSoftware. Labs printed.  3.  Body composition completed today with findings of: Total body fat: 43.8% (goal < 32%), Visceral Fat: 14 (goal <10), Muscle mass: 13% (goal >18%), and waist circumference 38 inches (goal <35 inches).  4.  Please obtain sleep study result and send via FairSoftware for my review.  5.  Fill your prescribed medication and take as discussed and prescribed: Finish up Zepbound 7.5 mg weekly pens and then increase to 10 mg weekly thereafter.    Start Zepbound at 2.5 mg weekly. After 4 weeks increase to the next dose of 5 mg weekly. If at a weight plateau for >3 weeks, then send FairSoftware message with current scale weight to determine if a dose adjustment is appropriate. Otherwise plan to maintain this dose until next visit. Any further dose titrations beyond this dose will be considered at appointments only, unless otherwise discussed. Visit the website [email or web address].[email or web address]-apartments.Tinsel Cinema and click on Consumers for additional details, savings, and further dosing instructions. This medication may require a prior authorization (PA) by your insurance. A PA may take one week plus to complete and our office will be in touch during this process if needed. If  cost/supply prohibitive plan: can continue cash pay option but switch to Darleen Direct for cost of $499/month or consider generic alternative to Contrave (www.contrave.com) with Naltrexone and Bupropion XL.      Tips while taking an injectable medication:    Be an intuitive eater. Listen to your hunger and fullness signals, stopping when you are full.  Consume protein and produce in your day, striving for a rainbow of color of produce.  Reduce portions to starting size of 1 cup and check in with your gut to see if you are full. Use a sand timer to slow down your eating pace to allow for 15-20 minutes to complete a meal and use the \"2 bite rule\".  Reduce refined sugars and high fat foods, as they may contribute to greater side effects of nausea and heartburn.  Stop eating 3 hours before bedtime to allow your food to digest.  Remain hydrated with water or non caloric and non caffeine beverages.  Use over the counter christa lozenge/supplement to help reduce nausea if needed.  If you have been off your medication for more than 2 weeks please notify our office to determine next dosing, as a return to previous dose may not be appropriate or tolerated.  Zepbound can be kept at room temperature for up to 3 weeks.      Please try to work on the following dietary changes this first month:    1.  Drink water with meals and throughout the day, cut down on soda and/or juice if consumed. Consider flavored water options like Bubbly, Spindrift, Hint and Amanda. Reduce alcohol servings to 4 per week maximum.  2.  Have protein with each meal, examples include: greek yogurt, cottage cheese, hard boiled egg, tofu, chicken, fish, or tuna.   3.  Work towards reducing/eliminating refined carbohydrates and sugars which includes items such as sweets, as well as rice, pasta, and bread and make sure to choose whole grain options when having them with just 1 serving per meal about the size of your inner palm.  4.  Consume non starchy veggies  daily working towards making them a good 50% of your daily food intake. Add them to lunch and dinner consistently.  5.  Start a daily probiotic: VSL#3 is recommended, (order on line at www.vsl3.com). Take 1 capsule daily with water for 30 days, then reduce to 1 every other day (this will reduce the cost). Capsules can be left out of refrigerator for 2 weeks. I recommend using a pill box weekly and keeping the bottle in the fridge.    Please download netta My Fitness Pal, LoseIt! Or My Net Diary to monitor daily dietary intake and you will be able to see if you are eating the right amount of calories or too much or too little which would hinder weight loss. Additionally this will help to see your daily carbohydrate and protein intake. When you set the netta up choose 1.5 lbs/week as a goal.  Keeping a paper food journal is an option as well to remain accountable for your choices- this is the start to mindful eating! A low calorie diet has been consistently shown to support weight loss.    Continue or start exercising to help establish a routine. If not already exercising begin with 1 day/week and progress as able with the goal of working towards 30 minutes 5 days a week at a minimum. A variety or cardio, strength and stretching is important. Review resources below to help support you in building this healthy routine.    Meditation daily can help manage and control stress. Chronic stress can make weight loss difficult.  Exercising is one way to help with stress, but meditation using the CALM Netta or another comparable alternative can be done in your home or place of work with little time commitment. This Netta can also help work on behavior change and improve sleep. Check out the segment under Calm Masterclass and listen to The 4 Pillars of Health. A great way to begin learning about the foundation of lifestyle with practical tips to use in your every day. In addition, we offer counseling services and support for individual  connection and care. A referral is necessary so please let me know if this is a service you are interested.    Check out www.yourweightmatters.org blog for continued support and education along your weight loss journey to optimal health!      Patient Resources:    Personal Training/Fitness Classes/Health Coaching    [facility] in [place]: Full fitness center with group fitness and personal training located in [place].  Health Coaching with Breanne Kaminski, Hudson Espinosa, and Talat Agarwal at our [facility]- individual coaching to work on your health goals. Call [phone] and/or email @ [email or web address]. Free 60 minute consult when client of [facility].  360FIT Azra @ http://www.Eponym. A variety of group fitness options plus various yoga classes [phone] and/or email Maddie at [email or web address]  [facility] Fitness Centers with multiple locations: P2Binvestor (www.Powelectrics), F45 Training (www.a88oarryxqgBMRW & Associates), Fit Body Bootcamp (www.Data SymmetrybodybootPayEasep.[employer]), Equigerminal (www.ADEA Cutters), The Exercise  (www.exercisecoach.com), Club PilArideas (www.clubTrue North Therapeutics.[employer])    Online Fitness  Fitness  on Xtreme Power  Fit in 10 DVD series   www.hpjqb55TPO.[employer]  Chair exercises via Sit and Be Fit (www.sitandbefit.org) and SceneDoc (www.Kaizen Platform.com) or Cristian Lopez or Carlos Manuel Winkler videos on YouTube.  Hip Hop Fit with Ascencion Reddy at www.hiphopfit.net    Apps for on the Go Fitness  Dodson 7 Minute Workout (orange box with white 7) - free on the go HIIT training netta  Peloton Netta @ www.onepeloton.com    Nutrition Trackers, Meal Preparation, and Other Meal Programs  LoseIT! And My Fitness Pal apps and on line for tracking nutrition  NOOM - virtual health coaching  FitFoundation (healthy meals on the go) in [place] @ wwwIninalduqtrrvnbiebe2oBMRW & Associates  Terence CRONIN @ [place].bistromd.[employer] and Qqpvpb93 (calorie  smart and low carb plans recommended) @ www.mitfad96.com, Metabolic Meals @ www.MyMetabolicMeals.com - individual prepared meals to go  Gobble, Blue Apron, Home , Every Plate, Sunbasket- on line meal delivery programs for preparation at home  Meal Village in [place] for homemade meals to go @ www.mealbttnllGlazeon.[employer]  Diet Doctor @ www.dietdoctor.com - low carb swaps  ReciMe and Mealime ximena (grocery and meal planning)    Stress, Anxiety, Depression, Trauma  CALM meditation ximena (www.calm.com)  Headspace  Don't let anxiety run your life. Using the science of emotion regulation and mindfulness to overcome fear and worry by Drake Pena PsyD and Pireto Pizarro MA.  The [employer] Podcast (September 27, 2023): 6 Magic Words That Stop Anxiety  What Happened to You?- a look at the impact trauma has on behavior written by Geremias Zhang and Dr. Chad Chu  Whole Again by Floyd Liriano - discovering your true self after trauma    Mindful Eating/The Hungry Brain  Am I Hungry? Mindful eating virtual  ximena (www.amihungry.com)  The Hungry Brain by Gaviota Ramirez, PhD  Mindless Eating by Simeon Marina  Weight Loss Surgery Will Not Treat Food Addiction by Simin Guaman Ph.D    Metabolic Dysfunction, Hormones and Cravings  Why We Get Sick? By Calvin Green (insulin resistance)  Your Body in Balance: The New Science of Food, Hormones, and Health by Dr. Mckay Damico  The Complete Guide to fasting by Dr. Mcconnell  Fast Like a Girl by Dr. Trinity Hunter  The M Factor (documentary on PBS about Menopause)  Sugar, Salt & Fat by Dunia Dockery, Ph.D, R.D.  The Truth About Sugar - documentary on sugar (Free on dermSearch, https://youtu.be/3B8inisSX9s)  Presentation on SUGAR called Sugar: The Bitter Truth by Dr. Dilan Arriaza (dermSearch) https://youtu.be/dBnniua6-oM?si=[email or web address]  Reverse Visceral Fat: #1 Way to Increase Your Lifespan & End Inflammation with Dr. Magen Franco on dermSearch @  https://youtu.be/nupPRnvUpJY?si=[email or web address]    Nutrition Support  You Are What You Eat - Netfix series on twin study looking at impact of nutrition changes on health  The End of Dieting: How to Live for Life by Dr. Landen Singer M.D. or listen to The Umeng Podcast Episode 63: Understanding \"Nutritarian\" Eating w/Dr. Landen Singer  The Game Changers- Netflix Documentary on plant based nutrition  The Dr. Holm T5 Wellness Plan by Dr. Sung Holm MD  The Complete Guide to fasting by Dr. Mcconnell  @[facility] ([facility] Dietician with support surrounding nutrition and meal prep/planning)    Education, Motivation and Support Resources  Live to 100: Secrets of the Blue Zones - Netflix series on the secrets to communities living over 100 years old  Atomic Habits by Spike Mccarthy (a book about taking small steps to promote greater behavior change)   Motivation ximena (black box with white \")- daily supportive messages sent to your phone  Can't Hurt Me by Drake Montanez (a book exploring the power of discipline in achieving your goals)  Fed Up - documentary about obesity (Free on Utube)  Www.yourweightmatters.org - Obesity Action Coalition sponsored Blog posts  Obesity Action Coalition Resources on topics specific to weight management (www.obesityaction.org)  Fitlosophy Fitspiration - journal to better health (journal book found at [employer] in fitness aisle)  Kwesi Herman talk titled: The Call to Courage (Netflix)  The Exam Room by the Physician's Committee (Podcast)  Nutrition Facts by Dr. Abel (Podcast)      Balanced Nutrition includes:     Build the mentality of Food 4 Fuel. Clean eating with whole foods and eliminating/reducing ultra processed foods.  Be an intuitive eater and using mindful eating practices.  Eat a balanced plate with protein and produce at all meals: 1/4 plate- protein, 1/2 plate non starchy veggies, and 1/4 plate fruit or complex carbohydrate.  Drink water with all meals and use a salad plate to  naturally reduce portions.  Eliminate/reduce late night eating by stopping after 7pm. Allowing your body to fast for 12 hours (drink only water, tea or black coffee without any additives).            Re-thinking Nutrition: Learning to See Food as Fuel  October 8, 2019  Posted in Blog, Nutrition  By Your Weight Matters Campaign    “Dieting” can start to feel challenging when we begin to associate healthy behaviors with “punishment.” Too often, we overlook the real reason we eat food. It's not only meant to be enjoyed, but also to provide basic fuel for our bodies.  Learning to see food as fuel can help you find balance in your journey with weight. It will teach you about the primal purpose of food, the effect certain foods have on health, and how to listen carefully to what your body is trying to tell you.  It Starts with Cells  Did you know your body has more than 35 trillion cells? Each one serves a purpose.  Once a cell has completed its purpose, it dies. Your body replaces dead and worn-out cells with new and energetic ones. It also depends on this ongoing cell cycle to keep you healthy. And guess what? The foods you eat help shape this process.  Seeing Food as Fuel  Eating the right foods helps build and repair cells to be stronger than before. It does this by getting nutrition from whole grains, vitamins, minerals, fats, protein and other nutrients.  These essential nutrients matter greatly to every single cell in your body. They make up your:  Cell membrane  Nucleus  Mitochondria  When cells join together, they make tissue that brings life to your bones, brain, skin, nerves, muscles, etc. The health and lifespan of your cells depend on the nutrients you get from food. That is why healthy food choices are so important.   Once you can start to see food as being fuel for your body, you will get better at making the healthy choice the easy choice. Food will be less about rewards or punishments and more about  supporting your overall health and happiness.  Building Blocks of Good Nutrition  A diet without enough fiber, protein and healthy fats can cause your cells to become brittle, leaky and tired. When cells can't do what they are designed to do, problems like inflammation, cancer and other difficult health conditions start to arise.  Foods that Support Healthy Cells:  Unsaturated Fats - Fish, nuts avocados, olive oil, flax seed, etc.  Protein - Poultry, lean beef, yogurt, eggs, seeds, beans, etc.  Antioxidants - Fruits, dark green veggies, sweet potatoes, tea, etc.  So, the next time you grab something to eat, ask yourself how that food helps or hurts your body. This is a part of living mindful and being knowledgeable about what you consume regularly.  When you start to see food as fuel, not just a tasty treat or the solution to a bad temper, you will start to make healthier choices more often. Making new habits is one of the building blocks to successful long-term weight management.      Mindful eating takes practice to build a life long habit. Often we want to eat but not for true hunger, rather it's triggered by emotional/physical or environmental reasons. Check out www.[employer] website for tools and tips as well as an ximena for daily support. Beyond these tools counseling can be an option to help support behavior change.    Get In Touch With Your Appetite- Hunger Cues  There are many signals that tell us it's time to eat (other than a rumbling stomach): television ads, social events, smells from the food court, and the candy bowl at the office. These factors in the environment trigger our senses and other mental processes that make us think we are hungry even when we’re not.  **The Hunger Rating Scale can help you decide if you are experiencing real hunger.  Remember that physical hunger builds gradually over time (usually over several hours after a meal), whereas emotional eating and cravings usually come on  very suddenly. When you are genuinely hungry, you may experience one or several of the symptoms listed below:  Stomach pangs or growling   Emptiness in the stomach   Irritability   Headache   Low energy/fatigue   Difficulty concentrating  How Does the Scale Work?  Before you eat, take a moment to rate your hunger. Think about how hungry you physically feel. Your goal is to eat between levels 4 and 6. This means you are eating when you are hungry but stopping when you are comfortably full.  Try not to put off eating for too long. Waiting until level 1 or 2 -- when you are starving and unable to concentrate -- may lead to overeating. When you first start to feel any of the symptoms listed above, you should probably start to think about eating.  We often let the sight of food tempt us when we are above a level 6 on the scale. Before you indulge, take a step back and think about how you feel. Did you just eat a few minutes ago? Are you eating in response to an emotion or because you are experiencing physical hunger?  Think of alternatives to eating for when these temptations arise. Some ideas are:  Drink a glass of cold water or another zero-calorie beverage   Take a walk to change the scenery   Do another form of exercise (sit-ups, running, swimming, tennis, etc.)   Call/text a friend or family member   Play a game with someone else  **   Hunger-Satiety Rating Scale    Full - 10        10 = Stuffed to the point of feeling sick   9 = Very uncomfortably full, need to loosen your belt    8 = Uncomfortably full, feel stuffed    7 = Very full, feel as if you have overeaten    6 = Comfortably full, satisfied    Neutral - 5    5 = Comfortable, neither hungry nor full   4 = Beginning signs and symptoms of hunger    3 = Hungry with several hunger symptoms, ready to eat    2 = Very hungry, unable to concentrate    Hungry - 1    1 = Starving, dizzy, irritable      ___________________________________________________________________  Taken from the [employer] Diabetes Association @ www.diabetes.org.  Last Edited: March 19, 2014, reviewed December 17, 2013    Emotional Eating and Overeating   You have to know how to stop emotional eating and stop overeating if you want to lose weight and keep it off successfully.  Emotional eating and overeating can’t really satisfy an insatiable appetite anyway.  And whether or not you’ve been trying to use emotional eating to soothe feelings of stress, depression, loneliness, frustration or boredom, in the long run, overeating to feed those feelings only makes them worse.  But learning how to stop overeating and control emotional eating can support healthy permanent weight loss and make you feel powerful.  Stop Emotional Eating - Don’t Use Foods to Soothe Moods  You probably already know that overeating high-fat, high-calorie, sweet, salty and unhealthy bad carbs won’t fill that empty void inside for long.  But what can you do to learn how to stop emotional overeating?  Most of us learn emotional eating at a very young age. We get into the habit of using food to sooth stressful feelings, alleviate boredom, reward and comfort ourselves, boost our sprits and celebrate with others.  But even though almost everyone’s overeating, you don’t have to. If you’re ready to take that old-frenzied feed-your-feelings bull by the shaji, here’s our basic 12 step program for how to stop emotional eating.  1. Make a commitment. Like any established bad habit, nothing will change unless you make a commit to changing your behavior.  2. Practice awareness. To be more conscious of what’s happening, jot down when and what you eat and how you feel before and afterwards.  3. Manage your stress. Healthy emotional distress management is an important life skill. Positive ways to reduce stress include regular exercise, relaxation techniques and getting support from family  and friends.  4. Be physically active. Exercise reduces stress and is a great mood enhancer too. So be sure you make time for regular physical activity.  5. Create new comforts. Make a list of healthy activities you enjoy. And, whenever you feel the need, treat yourself to something on your list.  6. Start eating healthier. When you eat for health you’ll choose more high fiber foods, such as vegetables, beans, whole grains and fresh fruits, plus healthy high protein foods, like fish, lean poultry and low-fat dairy.  7. Eat mini-meals often. By eating 5 or 6 small healthy meals a day, including breakfast, you help keep your blood sugar and moods stable.  8. Get rid of temptations. Don’t keep unhealthy food in the house, don’t shop for food when hungry or stressed and plan ahead before eating out.  9. Get enough sleep. When you’re tired, it’s easier to give in to emotional eating. Consider taking a nap and be sure to get a good nights sleep.  10. Use healthy distraction. Instead of overeating, take a walk, surf the Internet, pet your cat or dog, listen to music, enjoy a warm bath, read a good book, watch a movie, work in the garden or talk to a friend.  11. Practice mindfulness. Mindful eating means paying attention to the act of eating and observing your thoughts and feelings in the process.  12. Get some support. It’s easier to control emotional eating if you have a support network of friends or family. And if no one you know is supportive, make some new health-oriented friends or join a support group.  Learning how to stop emotional eating and overeating is a life-changing experience. Just make sure to stay on track and enjoy the journey.    Posted By Pa Crowell On December 27, 2015 @ 11:33 am In Healthy Living. Taken from [email or web address]      Build Muscle & Lose Fat  The great fat vs muscle diagram below paints a clear picture of why it’s so important for you to build muscle in order to lose fat.  Maybe  you’ve wondered about muscle vs fat and why you need to build muscle to lose fat, look slim and keep the inches off.  Well, look no further! With the fat vs muscle diagram below you’ll see why healthy permanent weight loss requires you to build muscle to lose fat.  Fat vs Muscle Diagram  The facts are clear. The best way to lose fat and look slimmer is to build muscle. Since one picture’s worth a thousand words, here’s 5 lbs of muscle vs fat of the same weight. Notice 5 lbs of fat is three times bigger.    This means that if you were to build 5 lbs of muscle and lose 5 lbs of fat, you would weigh exactly the same, but look smaller and firmer.  So imagine if it were 25 lbs or 50 lbs of lost fat vs muscle gained.  This is why it’s possible for you to lose fat inches when exercising, yet show no change in scale weight. And can you see how firm the muscle looks compared to the lumpy, tapioca pudding consistency of fat?  Build Muscle to Lose Fat Benefits  Although daily physical activity, like walking, swimming and aerobics are essential to good heart health and weight management, combining muscle building weight training with cardiovascular exercise, gives you an unbeatable combination to lose fat and keep it off permanently.  Of course it takes a few weeks before you see any measurable changes. But you’ll start to build muscle, lose fat and burn more calories from the moment you begin weight training. Building muscle helps you:  1. Burn more calories. Unlike fat, muscle beefs up your metabolism to help you burn about 70% more calories than fat can.  2. Improve appearance. When done properly, strength training can greatly improve your posture and help to prevent joint pain.  3. Build confidence. Strong muscles and joints increase your level of confidence in your abilities to perform many lifestyle activities.  4. Prevent injury. Strength training can build stronger muscles and more limber, flexible joints, which play a  crucial role in preventing injury.  5. Increase bone density. Weight bearing activities improve your bone density and reduce bone loss. This helps to prevent osteoporosis.  Studies show that weight training combined with aerobics and stretching is the best way to build a strong, firm body and keep it that way.  So, if you want to look and feel better than ever, you need to build muscle to lose fat.     Taken from www.[email or web address] by Pa Crowell      Return in about 4 months (around 9/29/2025) for weight management via clinic or Telemedicine Visit and in clinic in January.    Patient verbalizes understanding.    Renay Sanchez, APRN  5/29/2025    DOCUMENTATION OF TIME SPENT: Code selection for this visit was based on time spent : 60 minutes on date of service in preparing to see the patient, obtaining and/or reviewing separately obtained history, performing a medically appropriate examination, counseling and educating the patient/family/caregiver, ordering medications or testing, referring and communicating with other healthcare providers, documenting clinical information in the electronic medical record, independently interpreting results and communicating results to the patient/family/caregiver and care coordination with the patient's other providers.         [1]   Current Outpatient Medications on File Prior to Visit   Medication Sig Dispense Refill    estradiol (ESTRACE) 0.1 MG/GM Vaginal Cream Place 1 g vaginally every evening for 7 days, THEN 1 g twice a week. 42.5 g 3    Estradiol (DIVIGEL) 0.75 MG/0.75GM Transdermal Gel Place 1 packet onto the skin daily. 90 each 2    escitalopram 10 MG Oral Tab Take 1 tablet (10 mg total) by mouth daily. 90 tablet 4    albuterol 108 (90 Base) MCG/ACT Inhalation Aero Soln Inhale 1 puff into the lungs every 6 (six) hours as needed. 18 g 0    rosuvastatin 5 MG Oral Tab Take 1 tablet (5 mg total) by mouth every other day.      ergocalciferol 1.25 MG (32207 UT) Oral Cap        metoprolol succinate ER 25 MG Oral Tablet 24 Hr Take 0.5 tablets (12.5 mg total) by mouth daily. Half of 25mg      SYNTHROID 125 MCG Oral Tab        No current facility-administered medications on file prior to visit.

## 2025-05-29 NOTE — PATIENT INSTRUCTIONS
Welcome to the [facility]...your Lifestyle Renovation begins now!  Thank you for placing your trust in our health care team, I look forward to working with you along this journey to better health!    Next steps:     1.  Call our office at [phone] to schedule a personal nutrition consultation with one of our registered dieticians, Norman Haile. Bring along your food journal (3 days minimum). See journal options below.  2.  Complete fasting (10-12 hours, water only) labs at [facility] lab site prior to next office visit. Lab results will be communicated via [facility]. Labs printed.  3.  Body composition completed today with findings of: Total body fat: 43.8% (goal < 32%), Visceral Fat: 14 (goal <10), Muscle mass: 13% (goal >18%), and waist circumference 38 inches (goal <35 inches).  4.  Please obtain sleep study result and send via [facility] for my review.  5.  Fill your prescribed medication and take as discussed and prescribed: Finish up Zepbound 7.5 mg weekly pens and then increase to 10 mg weekly thereafter.    Start Zepbound at 2.5 mg weekly. After 4 weeks increase to the next dose of 5 mg weekly. If at a weight plateau for >3 weeks, then send [facility] message with current scale weight to determine if a dose adjustment is appropriate. Otherwise plan to maintain this dose until next visit. Any further dose titrations beyond this dose will be considered at appointments only, unless otherwise discussed. Visit the website [email or web address] and click on Consumers for additional details, savings, and further dosing instructions. This medication may require a prior authorization (PA) by your insurance. A PA may take one week plus to complete and our office will be in touch during this process if needed. If cost/supply prohibitive plan: can continue cash pay option but switch to Darleen Direct for cost of $499/month or consider generic alternative to Contrave (www.contrave.com) with Naltrexone  and Bupropion XL.      Tips while taking an injectable medication:    Be an intuitive eater. Listen to your hunger and fullness signals, stopping when you are full.  Consume protein and produce in your day, striving for a rainbow of color of produce.  Reduce portions to starting size of 1 cup and check in with your gut to see if you are full. Use a sand timer to slow down your eating pace to allow for 15-20 minutes to complete a meal and use the \"2 bite rule\".  Reduce refined sugars and high fat foods, as they may contribute to greater side effects of nausea and heartburn.  Stop eating 3 hours before bedtime to allow your food to digest.  Remain hydrated with water or non caloric and non caffeine beverages.  Use over the counter christa lozenge/supplement to help reduce nausea if needed.  If you have been off your medication for more than 2 weeks please notify our office to determine next dosing, as a return to previous dose may not be appropriate or tolerated.  Zepbound can be kept at room temperature for up to 3 weeks.      Please try to work on the following dietary changes this first month:    1.  Drink water with meals and throughout the day, cut down on soda and/or juice if consumed. Consider flavored water options like Bubbly, Spindrift, Hint and Amanda. Reduce alcohol servings to 4 per week maximum.  2.  Have protein with each meal, examples include: greek yogurt, cottage cheese, hard boiled egg, tofu, chicken, fish, or tuna.   3.  Work towards reducing/eliminating refined carbohydrates and sugars which includes items such as sweets, as well as rice, pasta, and bread and make sure to choose whole grain options when having them with just 1 serving per meal about the size of your inner palm.  4.  Consume non starchy veggies daily working towards making them a good 50% of your daily food intake. Add them to lunch and dinner consistently.  5.  Start a daily probiotic: VSL#3 is recommended, (order on line at  www.Share Some Style.com). Take 1 capsule daily with water for 30 days, then reduce to 1 every other day (this will reduce the cost). Capsules can be left out of refrigerator for 2 weeks. I recommend using a pill box weekly and keeping the bottle in the fridge.    Please download netta My Fitness Pal, LoseIt! Or My Net Diary to monitor daily dietary intake and you will be able to see if you are eating the right amount of calories or too much or too little which would hinder weight loss. Additionally this will help to see your daily carbohydrate and protein intake. When you set the netta up choose 1.5 lbs/week as a goal.  Keeping a paper food journal is an option as well to remain accountable for your choices- this is the start to mindful eating! A low calorie diet has been consistently shown to support weight loss.    Continue or start exercising to help establish a routine. If not already exercising begin with 1 day/week and progress as able with the goal of working towards 30 minutes 5 days a week at a minimum. A variety or cardio, strength and stretching is important. Review resources below to help support you in building this healthy routine.    Meditation daily can help manage and control stress. Chronic stress can make weight loss difficult.  Exercising is one way to help with stress, but meditation using the CALM Netta or another comparable alternative can be done in your home or place of work with little time commitment. This Netta can also help work on behavior change and improve sleep. Check out the segment under Calm Masterclass and listen to The 4 Pillars of Health. A great way to begin learning about the foundation of lifestyle with practical tips to use in your every day. In addition, we offer counseling services and support for individual connection and care. A referral is necessary so please let me know if this is a service you are interested.    Check out www.yourweightmatters.org blog for continued support and  education along your weight loss journey to optimal health!      Patient Resources:    Personal Training/Fitness Classes/Health Coaching    [facility] in [place]: Full fitness center with group fitness and personal training located in [place].  Health Coaching with Breanne Kaminski, Hudson Espinosa, and Talat Agarwal at our [facility]- individual coaching to work on your health goals. Call [phone] and/or email @ [email or web address]. Free 60 minute consult when client of [facility].  360FIT [employer] @ http://www.[employer]. A variety of group fitness options plus various yoga classes [phone] and/or email Maddie at [email or web address] Montgomery Memorial HospitalSlideBatch  [facility] Hospital[facility] Fitness Centers with multiple locations: H5 (www.Cerulean Pharma), Watermark Medical5 Training (www.Talentag), [facility] Body Bootcamp (www.ChoreMonster.Ontuitive), GENERAL MEDICAL MERATE (www.SensorLogic), The Exercise  (www.Connectivitycoach.com), Club PilLBE Security Master (www.Scrip-t.Ontuitive)    Online Fitness  Fitness  on Cyan  Fit in 10 DVD series   www.Labochema  Chair exercises via Sit and Be Fit (www.sitandbefit.org) and CoreFlow (www.Polyplex.com) or Cristian Lopez or Carlos Manuel Winkler videos on YouTube.  Hip Hop Fit with Ascencion Reddy at www.hiphopfit.King.com    Apps for on the Go Fitness  Dardanelle 7 Minute Workout (orange box with white 7) - free on the go HIIT training netta  Peloton Netta @ www.onepeloton.com    Nutrition Trackers, Meal Preparation, and Other Meal Programs  LoseIT! And My Fitness Pal apps and on line for tracking nutrition  NOOM - virtual health coaching  FitFoundation (healthy meals on the go) in [place] @ www.nfelldzjjflae9h.Ontuitive  Terence CRONIN @ www.bistromd.Ontuitive and Ajkijq98 (calorie smart and low carb plans recommended) @ www.tmbihz68.com, Metabolic Meals @ www.MyMetabolicMeals.com - individual prepared meals to go  Gobble, Blue Apron, Home , Every Plate,  Annie- on line meal delivery programs for preparation at home  Meal [place] in [place] for homemade meals to go @ www.mealHydrostor.CookBrite  Diet Doctor @ www.dietdoctor.com - low carb swaps  ReciMe and Mealime ximena (grocery and meal planning)    Stress, Anxiety, Depression, Trauma  CALM meditation ximena (www.calm.com)  Headspace  Don't let anxiety run your life. Using the science of emotion regulation and mindfulness to overcome fear and worry by Drake Pena PsyD and Prieto Pizarro MA.  The ClearStar Podcast (September 27, 2023): 6 Magic Words That Stop Anxiety  What Happened to You?- a look at the impact trauma has on behavior written by Geremias Zhang and Dr. Chad Chu  Whole Again by Floyd Liriano - discovering your true self after trauma    Mindful Eating/The Hungry Brain  Am I Hungry? Mindful eating virtual  ximena (www.amihungry.com)  The Hungry Brain by Gaviota Ramirez, PhD  Mindless Eating by Simeon Marina  Weight Loss Surgery Will Not Treat Food Addiction by Simin Guaman Ph.D    Metabolic Dysfunction, Hormones and Cravings  Why We Get Sick? By Calvin Green (insulin resistance)  Your Body in Balance: The New Science of Food, Hormones, and Health by Dr. Mckay Damico  The Complete Guide to fasting by Dr. Mcconnell  Fast Like a Girl by Dr. Trinity Hunter  The M Factor (documentary on PBS about Menopause)  Sugar, Salt & Fat by Dunia Dockery, Ph.D, R.D.  The Truth About Sugar - documentary on sugar (Free on Jammin Java, https://youtu.be/3W8dpevMJ2l)  Presentation on SUGAR called Sugar: The Bitter Truth by Dr. Dilan Arriaza (Jammin Java) https://youtu.be/[email or web address]-oM?[email or web address]  Reverse Visceral Fat: #1 Way to Increase Your Lifespan & End Inflammation with Dr. Magen Franco on Utube @ https://youtu.be/nupPRnvUpJY?si=[email or web address]    Nutrition Support  You Are What You Eat - Netfix series on twin study looking at impact of nutrition changes on health  The End of Dieting: How to Live for Life by Dr. Schuster  KIN Singer. or listen to The Flinto Podcast Episode 63: Understanding \"Nutritarian\" Eating w/Dr. Landen Singer  The Game Changers- Netflix Documentary on plant based nutrition  The Dr. Holm T5 Wellness Plan by Dr. Sung Holm MD  The Complete Guide to fasting by Dr. Mcconnell  @[facility] ([facility] Dietician with support surrounding nutrition and meal prep/planning)    Education, Motivation and Support Resources  Live to 100: Secrets of the Blue Zones - Netflix series on the secrets to communities living over 100 years old  Atomic Habits by Spike Mccarthy (a book about taking small steps to promote greater behavior change)   Motivation ximena (black box with white \")- daily supportive messages sent to your phone  Can't Hurt Me by Drake Montanez (a book exploring the power of discipline in achieving your goals)  Fed Up - documentary about obesity (Free on Utube)  Www.yourweightmatters.org - Obesity Action Coalition sponsored Blog posts  Obesity Action Coalition Resources on topics specific to weight management (www.obesityaction.org)  Fitlosophy Fitspiration - journal to better health (journal book found at [employer] in fitness aisle)  Kwesi Herman talk titled: The Call to Courage (Netflix)  The Exam Room by the Physician's Committee (Podcast)  Nutrition Facts by Dr. Abel (Podcast)      Balanced Nutrition includes:     Build the mentality of Food 4 Fuel. Clean eating with whole foods and eliminating/reducing ultra processed foods.  Be an intuitive eater and using mindful eating practices.  Eat a balanced plate with protein and produce at all meals: 1/4 plate- protein, 1/2 plate non starchy veggies, and 1/4 plate fruit or complex carbohydrate.  Drink water with all meals and use a salad plate to naturally reduce portions.  Eliminate/reduce late night eating by stopping after 7pm. Allowing your body to fast for 12 hours (drink only water, tea or black coffee without any additives).            Re-thinking Nutrition:  Learning to See Food as Fuel  October 8, 2019  Posted in Blog, Nutrition  By Your Weight Matters Campaign    “Dieting” can start to feel challenging when we begin to associate healthy behaviors with “punishment.” Too often, we overlook the real reason we eat food. It's not only meant to be enjoyed, but also to provide basic fuel for our bodies.  Learning to see food as fuel can help you find balance in your journey with weight. It will teach you about the primal purpose of food, the effect certain foods have on health, and how to listen carefully to what your body is trying to tell you.  It Starts with Cells  Did you know your body has more than 35 trillion cells? Each one serves a purpose.  Once a cell has completed its purpose, it dies. Your body replaces dead and worn-out cells with new and energetic ones. It also depends on this ongoing cell cycle to keep you healthy. And guess what? The foods you eat help shape this process.  Seeing Food as Fuel  Eating the right foods helps build and repair cells to be stronger than before. It does this by getting nutrition from whole grains, vitamins, minerals, fats, protein and other nutrients.  These essential nutrients matter greatly to every single cell in your body. They make up your:  Cell membrane  Nucleus  Mitochondria  When cells join together, they make tissue that brings life to your bones, brain, skin, nerves, muscles, etc. The health and lifespan of your cells depend on the nutrients you get from food. That is why healthy food choices are so important.   Once you can start to see food as being fuel for your body, you will get better at making the healthy choice the easy choice. Food will be less about rewards or punishments and more about supporting your overall health and happiness.  Building Blocks of Good Nutrition  A diet without enough fiber, protein and healthy fats can cause your cells to become brittle, leaky and tired. When cells can't do what they are  designed to do, problems like inflammation, cancer and other difficult health conditions start to arise.  Foods that Support Healthy Cells:  Unsaturated Fats - Fish, nuts avocados, olive oil, flax seed, etc.  Protein - Poultry, lean beef, yogurt, eggs, seeds, beans, etc.  Antioxidants - Fruits, dark green veggies, sweet potatoes, tea, etc.  So, the next time you grab something to eat, ask yourself how that food helps or hurts your body. This is a part of living mindful and being knowledgeable about what you consume regularly.  When you start to see food as fuel, not just a tasty treat or the solution to a bad temper, you will start to make healthier choices more often. Making new habits is one of the building blocks to successful long-term weight management.      Mindful eating takes practice to build a life long habit. Often we want to eat but not for true hunger, rather it's triggered by emotional/physical or environmental reasons. Check out www.UnFlete.com website for tools and tips as well as an ximena for daily support. Beyond these tools counseling can be an option to help support behavior change.    Get In Touch With Your Appetite- Hunger Cues  There are many signals that tell us it's time to eat (other than a rumbling stomach): television ads, social events, smells from the food court, and the candy bowl at the office. These factors in the environment trigger our senses and other mental processes that make us think we are hungry even when we’re not.  **The Hunger Rating Scale can help you decide if you are experiencing real hunger.  Remember that physical hunger builds gradually over time (usually over several hours after a meal), whereas emotional eating and cravings usually come on very suddenly. When you are genuinely hungry, you may experience one or several of the symptoms listed below:  Stomach pangs or growling   Emptiness in the stomach   Irritability   Headache   Low energy/fatigue   Difficulty  concentrating  How Does the Scale Work?  Before you eat, take a moment to rate your hunger. Think about how hungry you physically feel. Your goal is to eat between levels 4 and 6. This means you are eating when you are hungry but stopping when you are comfortably full.  Try not to put off eating for too long. Waiting until level 1 or 2 -- when you are starving and unable to concentrate -- may lead to overeating. When you first start to feel any of the symptoms listed above, you should probably start to think about eating.  We often let the sight of food tempt us when we are above a level 6 on the scale. Before you indulge, take a step back and think about how you feel. Did you just eat a few minutes ago? Are you eating in response to an emotion or because you are experiencing physical hunger?  Think of alternatives to eating for when these temptations arise. Some ideas are:  Drink a glass of cold water or another zero-calorie beverage   Take a walk to change the scenery   Do another form of exercise (sit-ups, running, swimming, tennis, etc.)   Call/text a friend or family member   Play a game with someone else  **   Hunger-Satiety Rating Scale    Full - 10        10 = Stuffed to the point of feeling sick   9 = Very uncomfortably full, need to loosen your belt    8 = Uncomfortably full, feel stuffed    7 = Very full, feel as if you have overeaten    6 = Comfortably full, satisfied    Neutral - 5    5 = Comfortable, neither hungry nor full   4 = Beginning signs and symptoms of hunger    3 = Hungry with several hunger symptoms, ready to eat    2 = Very hungry, unable to concentrate    Hungry - 1    1 = Starving, dizzy, irritable     ___________________________________________________________________  Taken from the American Diabetes Association @ www.diabetes.org.  Last Edited: March 19, 2014, reviewed December 17, 2013    Emotional Eating and Overeating   You have to know how to stop emotional eating and stop  overeating if you want to lose weight and keep it off successfully.  Emotional eating and overeating can’t really satisfy an insatiable appetite anyway.  And whether or not you’ve been trying to use emotional eating to soothe feelings of stress, depression, loneliness, frustration or boredom, in the long run, overeating to feed those feelings only makes them worse.  But learning how to stop overeating and control emotional eating can support healthy permanent weight loss and make you feel powerful.  Stop Emotional Eating - Don’t Use Foods to Soothe Moods  You probably already know that overeating high-fat, high-calorie, sweet, salty and unhealthy bad carbs won’t fill that empty void inside for long.  But what can you do to learn how to stop emotional overeating?  Most of us learn emotional eating at a very young age. We get into the habit of using food to sooth stressful feelings, alleviate boredom, reward and comfort ourselves, boost our sprits and celebrate with others.  But even though almost everyone’s overeating, you don’t have to. If you’re ready to take that old-frenzied feed-your-feelings bull by the shaji, here’s our basic 12 step program for how to stop emotional eating.  1. Make a commitment. Like any established bad habit, nothing will change unless you make a commit to changing your behavior.  2. Practice awareness. To be more conscious of what’s happening, jot down when and what you eat and how you feel before and afterwards.  3. Manage your stress. Healthy emotional distress management is an important life skill. Positive ways to reduce stress include regular exercise, relaxation techniques and getting support from family and friends.  4. Be physically active. Exercise reduces stress and is a great mood enhancer too. So be sure you make time for regular physical activity.  5. Create new comforts. Make a list of healthy activities you enjoy. And, whenever you feel the need, treat yourself to something  on your list.  6. Start eating healthier. When you eat for health you’ll choose more high fiber foods, such as vegetables, beans, whole grains and fresh fruits, plus healthy high protein foods, like fish, lean poultry and low-fat dairy.  7. Eat mini-meals often. By eating 5 or 6 small healthy meals a day, including breakfast, you help keep your blood sugar and moods stable.  8. Get rid of temptations. Don’t keep unhealthy food in the house, don’t shop for food when hungry or stressed and plan ahead before eating out.  9. Get enough sleep. When you’re tired, it’s easier to give in to emotional eating. Consider taking a nap and be sure to get a good nights sleep.  10. Use healthy distraction. Instead of overeating, take a walk, surf the Internet, pet your cat or dog, listen to music, enjoy a warm bath, read a good book, watch a movie, work in the garden or talk to a friend.  11. Practice mindfulness. Mindful eating means paying attention to the act of eating and observing your thoughts and feelings in the process.  12. Get some support. It’s easier to control emotional eating if you have a support network of friends or family. And if no one you know is supportive, make some new health-oriented friends or join a support group.  Learning how to stop emotional eating and overeating is a life-changing experience. Just make sure to stay on track and enjoy the journey.    Posted By Pa Crowell On December 27, 2015 @ 11:33 am In Healthy Living. Taken from www.Formotus      Build Muscle & Lose Fat  The great fat vs muscle diagram below paints a clear picture of why it’s so important for you to build muscle in order to lose fat.  Maybe you’ve wondered about muscle vs fat and why you need to build muscle to lose fat, look slim and keep the inches off.  Well, look no further! With the fat vs muscle diagram below you’ll see why healthy permanent weight loss requires you to build muscle to lose fat.  Fat vs Muscle  Diagram  The facts are clear. The best way to lose fat and look slimmer is to build muscle. Since one picture’s worth a thousand words, here’s 5 lbs of muscle vs fat of the same weight. Notice 5 lbs of fat is three times bigger.    This means that if you were to build 5 lbs of muscle and lose 5 lbs of fat, you would weigh exactly the same, but look smaller and firmer.  So imagine if it were 25 lbs or 50 lbs of lost fat vs muscle gained.  This is why it’s possible for you to lose fat inches when exercising, yet show no change in scale weight. And can you see how firm the muscle looks compared to the lumpy, tapioca pudding consistency of fat?  Build Muscle to Lose Fat Benefits  Although daily physical activity, like walking, swimming and aerobics are essential to good heart health and weight management, combining muscle building weight training with cardiovascular exercise, gives you an unbeatable combination to lose fat and keep it off permanently.  Of course it takes a few weeks before you see any measurable changes. But you’ll start to build muscle, lose fat and burn more calories from the moment you begin weight training. Building muscle helps you:  1. Burn more calories. Unlike fat, muscle beefs up your metabolism to help you burn about 70% more calories than fat can.  2. Improve appearance. When done properly, strength training can greatly improve your posture and help to prevent joint pain.  3. Build confidence. Strong muscles and joints increase your level of confidence in your abilities to perform many lifestyle activities.  4. Prevent injury. Strength training can build stronger muscles and more limber, flexible joints, which play a crucial role in preventing injury.  5. Increase bone density. Weight bearing activities improve your bone density and reduce bone loss. This helps to prevent osteoporosis.  Studies show that weight training combined with aerobics and stretching is the best way to build a strong,  firm body and keep it that way.  So, if you want to look and feel better than ever, you need to build muscle to lose fat.     Taken from www.Fangcang.Next Points by Pa Crowell

## 2025-06-02 ENCOUNTER — OFFICE VISIT (OUTPATIENT)
Facility: CLINIC | Age: 55
End: 2025-06-02
Payer: COMMERCIAL

## 2025-06-02 VITALS
RESPIRATION RATE: 16 BRPM | HEIGHT: 63 IN | HEART RATE: 73 BPM | BODY MASS INDEX: 37.03 KG/M2 | OXYGEN SATURATION: 99 % | WEIGHT: 209 LBS

## 2025-06-02 DIAGNOSIS — E66.812 CLASS 2 SEVERE OBESITY WITH SERIOUS COMORBIDITY AND BODY MASS INDEX (BMI) OF 36.0 TO 36.9 IN ADULT, UNSPECIFIED OBESITY TYPE (HCC): ICD-10-CM

## 2025-06-02 DIAGNOSIS — M35.01 SJOGREN'S SYNDROME WITH KERATOCONJUNCTIVITIS SICCA (HCC): ICD-10-CM

## 2025-06-02 DIAGNOSIS — M35.9 UNDIFFERENTIATED CONNECTIVE TISSUE DISEASE (HCC): ICD-10-CM

## 2025-06-02 DIAGNOSIS — E66.01 CLASS 2 SEVERE OBESITY WITH SERIOUS COMORBIDITY AND BODY MASS INDEX (BMI) OF 36.0 TO 36.9 IN ADULT, UNSPECIFIED OBESITY TYPE (HCC): ICD-10-CM

## 2025-06-02 DIAGNOSIS — G47.33 OSA ON CPAP: Primary | ICD-10-CM

## 2025-06-02 DIAGNOSIS — I47.19 ATRIAL TACHYCARDIA (HCC): ICD-10-CM

## 2025-06-02 PROCEDURE — 99204 OFFICE O/P NEW MOD 45 MIN: CPT | Performed by: OTHER

## 2025-06-02 PROCEDURE — 3008F BODY MASS INDEX DOCD: CPT | Performed by: OTHER

## 2025-06-02 NOTE — PROGRESS NOTES
[facility] PULMONARY  SLEEP PROGRESS NOTE        HPI:     54 year old female coming in for   Chief Complaint   Patient presents with    Obstructive Sleep Apnea (JEOVANY)     Sleep consult       HPI:   Dx with JEOVANY 2 years ago at [facility]  Cpap advised  with supplemental O2  She had \"scarring on her lungs\"    She is working on obtaining records  Uses her device regularly and AHI  aroun 1.0  Not sure about severeity of her apnea  She is not using supplemental O2      Sleep hours 6  In bed 1030, SL 1 hour  Wakes 5 am  Wakes two times a night  Works --[employer], [employer],   Has some drowsy driving , precautions addressed    Patient: Sleep review of systems today: see form.      Pt  PCP:  Cydney Ovalles, DO  No referring provider defined for this encounter.           No data to display                    Past Medical History[1]  Past Surgical History[2]  Social History:  Social History     Social History Narrative    Not on file     Short Social Hx on File[3]  Family History:  Family History[4]  Allergies:  Allergies[5]  Current Meds:  Current Medications[6]   Counseling given: Not Answered         Problem List:  Problem List[7]    REVIEW OF SYSTEMS:   Review of Systems    EXAM:   Pulse 73   Resp 16   Ht 5' 3\" (1.6 m)   Wt 209 lb (94.8 kg)   SpO2 99%   BMI 37.02 kg/m²  Estimated body mass index is 37.02 kg/m² as calculated from the following:    Height as of this encounter: 5' 3\" (1.6 m).    Weight as of this encounter: 209 lb (94.8 kg).   Neck in inches:      Wt Readings from Last 6 Encounters:   06/02/25 209 lb (94.8 kg)   05/29/25 209 lb (94.8 kg)   05/13/25 213 lb (96.6 kg)   03/11/25 217 lb 9.6 oz (98.7 kg)   02/13/25 212 lb 8.4 oz (96.4 kg)   02/04/25 212 lb (96.2 kg)     BP Readings from Last 3 Encounters:   05/29/25 110/70   05/13/25 115/80   03/11/25 122/70     Pulse Readings from Last 3 Encounters:   06/02/25 73   05/29/25 82   03/11/25 70     SpO2 Readings from Last 3 Encounters:   06/02/25 99%   03/11/25 96%    02/04/25 98%      Ideal body weight: 52.4 kg (115 lb 8.3 oz)  Adjusted ideal body weight: 69.4 kg (152 lb 14.6 oz)    Vital signs reviewed.  Physical Exam    ASSESSMENT AND PLAN:   1. JEOVANY on CPAP  Split study, 2-3 hour baseline then apply cpap and assess for O2 requirement, this was her prior regimen.    Followup 4 months  2. Undifferentiated connective tissue disease (HCC)    3. Atrial tachycardia (HCC)    4. Sjogren's syndrome with keratoconjunctivitis sicca (HCC)    5. Class 2 severe obesity with serious comorbidity and body mass index (BMI) of 36.0 to 36.9 in adult, unspecified obesity type (HCC)  On zepound  There are no Patient Instructions on file for this visit.    Independent interpretation of Sleep Download as defined above.  Continue with Rx management of Sleep apnea with PAP therapy.    COMPLIANCE is required by insurance for 4 hours a night most nights of the week.    Advised if still with sleep apnea and not using CPAP has a 7 fold increase in risk of heart attack, stroke, abnormal heart rhythm  and death,  increased risk of driving accidents.     Advised to refrain from driving when sleepy.      Recommend weight loss, and maintain and optimal BMI with Exercise 30 minutes most days to target heart rate .     Advised patient to change filters,masks,hoses  and tubes and equiptment on a  regular schedule.    Filters and seals shall be changed every 1 month,  Hoses every 3 months,   CPAP mask and humidifier chamber changed every 6 month  with the durable medical equipment provider.         Meds & Refills for this Visit:  Requested Prescriptions      No prescriptions requested or ordered in this encounter       Outcome: Parent verbalizes understanding. Parent is notified to call with any questions, complications, allergies, or worsening or changing symptoms.  Parent is to call with any side effects or complications from the treatments as a result of today.     \" This note was created utilizing Dragon  speech recognition software.  Please excuse any grammatical errors. Call my office if you have any questions regarding this note. \"     Annabelle Mena,   2025  1:20 PM         [1]   Past Medical History:   Allergic rhinitis    Anxiety    Not formally diagnosed    Graves disease    Hyperthyroidism    Graves disease. Had AJ now on Synthoid    Hypothyroidism    After AJ    Sleep apnea   [2]   Past Surgical History:  Procedure Laterality Date    D & c      Freeing bowel adhesion,enterolysis N/A 2024    Hysterectomy  2018    partial hystero.      ,     Skin surgery      Tonsillectomy     [3]   Social History  Socioeconomic History    Marital status:    Tobacco Use    Smoking status: Never     Passive exposure: Never    Smokeless tobacco: Never   Vaping Use    Vaping status: Never Used   Substance and Sexual Activity    Alcohol use: Yes     Comment: Occasionally when out to dinner with friends    Drug use: Not Currently     Types: Cannabis     Comment: Haven’t tried or used in well over a year. Tried a gummy    Sexual activity: Yes     Partners: Male   Other Topics Concern    Caffeine Concern No    Exercise Yes     Comment: 1-3x per week    Seat Belt Yes    Special Diet Yes     Comment: Avoid dairy, lactose intolerance    Stress Concern No    Weight Concern Yes     Social Drivers of Health     Food Insecurity: No Food Insecurity (2025)    NCSS - Food Insecurity     Worried About Running Out of Food in the Last Year: No     Ran Out of Food in the Last Year: No   Transportation Needs: No Transportation Needs (2025)    NCSS - Transportation     Lack of Transportation: No   Housing Stability: Not At Risk (2025)    NCSS - Housing/Utilities     Has Housing: Yes     Worried About Losing Housing: No     Unable to Get Utilities: No   [4]   Family History  Problem Relation Age of Onset    Heart Disorder Father     Diabetes Father     Hypertension Father     Heart Disease Father      Heart Disorder Mother     Hypertension Mother     Heart Disease Mother     Hypertension Sister    [5]   Allergies  Allergen Reactions    Azithromycin HIVES    Erythromycin OTHER (SEE COMMENTS) and UNKNOWN    Penicillins HIVES and OTHER (SEE COMMENTS)    Sulfa Antibiotics HIVES    Amoxicillin DIZZINESS    Milk Protein Extract OTHER (SEE COMMENTS)   [6]   Current Outpatient Medications   Medication Sig Dispense Refill    Tirzepatide-Weight Management (ZEPBOUND) 10 MG/0.5ML Subcutaneous Solution Auto-injector Inject 10 mg into the skin once a week. 2 mL 3    estradiol (ESTRACE) 0.1 MG/GM Vaginal Cream Place 1 g vaginally every evening for 7 days, THEN 1 g twice a week. 42.5 g 3    Estradiol (DIVIGEL) 0.75 MG/0.75GM Transdermal Gel Place 1 packet onto the skin daily. 90 each 2    escitalopram 10 MG Oral Tab Take 1 tablet (10 mg total) by mouth daily. 90 tablet 4    albuterol 108 (90 Base) MCG/ACT Inhalation Aero Soln Inhale 1 puff into the lungs every 6 (six) hours as needed. 18 g 0    rosuvastatin 5 MG Oral Tab Take 1 tablet (5 mg total) by mouth every other day.      ergocalciferol 1.25 MG (43517 UT) Oral Cap       metoprolol succinate ER 25 MG Oral Tablet 24 Hr Take 0.5 tablets (12.5 mg total) by mouth daily. Half of 25mg      SYNTHROID 125 MCG Oral Tab      [7]   Patient Active Problem List  Diagnosis    History of hysterectomy    History of morbid obesity    Family history of heart disease    Anxiety    Class 2 severe obesity with serious comorbidity and body mass index (BMI) of 36.0 to 36.9 in adult (HCC)    JEOVANY on CPAP    Post-operative hypothyroidism    Atrial tachycardia (HCC)    Familial hypercholesterolemia    Sjogren's syndrome with keratoconjunctivitis sicca (HCC)    Undifferentiated connective tissue disease (HCC)    History of small bowel obstruction    Encounter for therapeutic drug monitoring

## 2025-06-02 NOTE — PROGRESS NOTES
MARIANELA PEREIRA  2025 - 2025  Patient ID: [id number]  : 1970  Age: 54 years  [facility]  [address]  [address]  [place], [facility] [place]  Phone: [phone]  Email: [email or web address]  Compliance Report  Usage 2025 - 2025  Usage days 89/90 days (99%)  >= 4 hours 88 days (98%)  < 4 hours 1 days (1%)  Usage hours 597 hours 44 minutes  Average usage (total days) 6 hours 38 minutes  Average usage (days used) 6 hours 43 minutes  Median usage (days used) 6 hours 38 minutes  AirSense 11 AutoSet  Serial number [id number]  Mode AutoSet  Min Pressure 5 cmH2O  Max Pressure 11 cmH2O  EPR Fulltime  EPR level 3  Response Standard  Therapy  Pressure - cmH2O Median: 7.4 95th percentile: 9.7 Maximum: 10.5  Leaks - L/min Median: 0.4 95th percentile: 6.7 Maximum: 11.4  Events per hour AI: 1.1 HI: 0.1 AHI: 1.2  Apnea Index Central: 0.0 Obstructive: 1.1 Unknown: 0.0  RERA Index 0.0  Cheyne-Olmos respiration (average duration per night) 0 minutes (0%)

## 2025-06-03 ENCOUNTER — OFFICE VISIT (OUTPATIENT)
Dept: INTERNAL MEDICINE CLINIC | Facility: CLINIC | Age: 55
End: 2025-06-03
Payer: COMMERCIAL

## 2025-06-03 VITALS
BODY MASS INDEX: 37.74 KG/M2 | RESPIRATION RATE: 16 BRPM | OXYGEN SATURATION: 96 % | TEMPERATURE: 98 F | HEART RATE: 76 BPM | SYSTOLIC BLOOD PRESSURE: 138 MMHG | WEIGHT: 213 LBS | HEIGHT: 63 IN | DIASTOLIC BLOOD PRESSURE: 78 MMHG

## 2025-06-03 DIAGNOSIS — E66.812 CLASS 2 SEVERE OBESITY WITH SERIOUS COMORBIDITY AND BODY MASS INDEX (BMI) OF 36.0 TO 36.9 IN ADULT, UNSPECIFIED OBESITY TYPE (HCC): Primary | ICD-10-CM

## 2025-06-03 DIAGNOSIS — E66.01 CLASS 2 SEVERE OBESITY WITH SERIOUS COMORBIDITY AND BODY MASS INDEX (BMI) OF 36.0 TO 36.9 IN ADULT, UNSPECIFIED OBESITY TYPE (HCC): Primary | ICD-10-CM

## 2025-06-03 PROBLEM — Z86.39 HISTORY OF MORBID OBESITY: Status: RESOLVED | Noted: 2022-11-15 | Resolved: 2025-06-03

## 2025-06-03 PROCEDURE — 3075F SYST BP GE 130 - 139MM HG: CPT | Performed by: INTERNAL MEDICINE

## 2025-06-03 PROCEDURE — G2211 COMPLEX E/M VISIT ADD ON: HCPCS | Performed by: INTERNAL MEDICINE

## 2025-06-03 PROCEDURE — 99213 OFFICE O/P EST LOW 20 MIN: CPT | Performed by: INTERNAL MEDICINE

## 2025-06-03 PROCEDURE — 3078F DIAST BP <80 MM HG: CPT | Performed by: INTERNAL MEDICINE

## 2025-06-03 PROCEDURE — 3008F BODY MASS INDEX DOCD: CPT | Performed by: INTERNAL MEDICINE

## 2025-06-03 NOTE — PATIENT INSTRUCTIONS
If the 10 mg and 7.5 mg of Mounjaro cost the same then  the 10 mg dose.  Continue follow-up with the weight loss clinic but if you need assistance with Darleen directs through us please follow-up with me.  See me yearly for routine checkup

## 2025-06-03 NOTE — PROGRESS NOTES
Patient Office Visit    ASSESSMENT AND PLAN:     1. Class 2 severe obesity with serious comorbidity and body mass index (BMI) of 36.0 to 36.9 in adult, unspecified obesity type (HCC)  Note: Advised patient to  the 10 mg of Mounjaro if the cost is the same.  If this 7.5 is menses but then she can  the 7.5 mg.  She will continue follow-up with the weight loss clinic but if she would like to follow-up with us she may schedule an appointment in 3 months otherwise I will see her yearly for routine check          Patient/Caregiver Education: Patient/Caregiver Education: There are no barriers to learning. Medical education done. Outcome: Patient verbalizes understanding. Patient is notified to call with any questions, complications, allergies, or worsening or changing symptoms.  Patient is to call with any side effects or complications from the treatments as a result of today.      Reviewed Past Medical History and   Problem List[1]    No orders of the defined types were placed in this encounter.    Requested Prescriptions      No prescriptions requested or ordered in this encounter         Cydney Ovalles DO  CC:  Chief Complaint   Patient presents with    Follow - Up         HPI:   Sonja Hines is a 54-year-old female who presents for follow-up.  She did see the weight loss clinic and has been taking Mounjaro 7.5 mg.  When she saw the clinic they did increase it to 10 mg but it was not covered through her insurance.  She is planning on getting the medications through [facility] but she will discuss more with the weight loss clinic.  She was not sure if she should  the 7.5 mg or the 10 mg of the Mounjaro as neither of them are covered.    Past Medical History[2]    Past Surgical History[3]    Social History:  Short Social Hx on File[4]  Family History:  Family History[5]  Allergies:  Allergies[6]  Current Meds:  Medications Ordered Prior to Encounter[7]      REVIEW OF SYSTEMS   Constitutional:  no fatigue normal energy no weight changes   HENT: normal sinuses and no mouth issues   Eyes: . normal vision no eye pain   Respiratory: normal respirations no cough   Cardiovascular: no CP, or palpitations   Gastrointestinal: normal bowels and no abd pains   Genitourinary:  normal urination no hematuria, no frequency   Musculoskeletal: no pains in arms/legs, normal range of motion   Skin: no rashes or skin lesions that are new   Neurological:  no weakness, no numbness, normal gait   Hematological:  no bruises or bleeding   Psychiatric/Behavioral: normal mood no anxiety normal behavior     /78 (BP Location: Right arm, Patient Position: Sitting, Cuff Size: adult)   Pulse 76   Temp 98.1 °F (36.7 °C) (Temporal)   Resp 16   Ht 5' 3\" (1.6 m)   Wt 213 lb (96.6 kg)   SpO2 96%   BMI 37.73 kg/m²     PHYSICAL EXAM:   Constitutional: Vital signs reviewed as noted, well developed, in no acute distress.   HENT: NCAT  Eyes: pupils reactive bilaterally  Cardiovascular: nl s1 s2 no m/r/g  Pulmonary/Chest: CTA bilaterally with no wheezes  Extremities: no pedal edema   Neurological:  no weakness in UE and LE, reflexes are normal  Skin: no rashes or bruises on visualized skin, not undressed   Psychiatric:normal mood              [1]   Patient Active Problem List  Diagnosis    History of hysterectomy    Family history of heart disease    Anxiety    Class 2 severe obesity with serious comorbidity and body mass index (BMI) of 36.0 to 36.9 in adult (HCC)    JEOVANY on CPAP    Post-operative hypothyroidism    Atrial tachycardia (HCC)    Familial hypercholesterolemia    Sjogren's syndrome with keratoconjunctivitis sicca (HCC)    Undifferentiated connective tissue disease (HCC)    History of small bowel obstruction    Encounter for therapeutic drug monitoring   [2]   Past Medical History:   Allergic rhinitis    Anxiety    Not formally diagnosed    Graves disease    Hyperthyroidism    Graves disease. Had AJ now on Synthoid     Hypothyroidism    After AJ    Sleep apnea   [3]   Past Surgical History:  Procedure Laterality Date    D & c      Freeing bowel adhesion,enterolysis N/A 2024    Hysterectomy  2018    partial hystero.      ,     Skin surgery      Tonsillectomy     [4]   Social History  Socioeconomic History    Marital status:    Tobacco Use    Smoking status: Never     Passive exposure: Never    Smokeless tobacco: Never   Vaping Use    Vaping status: Never Used   Substance and Sexual Activity    Alcohol use: Yes     Comment: Occasionally when out to dinner with friends    Drug use: Not Currently     Types: Cannabis     Comment: Haven’t tried or used in well over a year. Tried a gummy    Sexual activity: Yes     Partners: Male   Other Topics Concern    Caffeine Concern No    Exercise Yes     Comment: 1-3x per week    Seat Belt Yes    Special Diet Yes     Comment: Avoid dairy, lactose intolerance    Stress Concern No    Weight Concern Yes     Social Drivers of Health     Food Insecurity: No Food Insecurity (2025)    NCSS - Food Insecurity     Worried About Running Out of Food in the Last Year: No     Ran Out of Food in the Last Year: No   Transportation Needs: No Transportation Needs (2025)    NCSS - Transportation     Lack of Transportation: No   Housing Stability: Not At Risk (2025)    NCSS - Housing/Utilities     Has Housing: Yes     Worried About Losing Housing: No     Unable to Get Utilities: No   [5]   Family History  Problem Relation Age of Onset    Heart Disorder Father     Diabetes Father     Hypertension Father     Heart Disease Father     Heart Disorder Mother     Hypertension Mother     Heart Disease Mother     Hypertension Sister    [6]   Allergies  Allergen Reactions    Azithromycin HIVES    Erythromycin OTHER (SEE COMMENTS) and UNKNOWN    Penicillins HIVES and OTHER (SEE COMMENTS)    Sulfa Antibiotics HIVES    Amoxicillin DIZZINESS    Milk Protein Extract OTHER (SEE COMMENTS)    [7]   Current Outpatient Medications on File Prior to Visit   Medication Sig Dispense Refill    Tirzepatide-Weight Management (ZEPBOUND) 10 MG/0.5ML Subcutaneous Solution Auto-injector Inject 10 mg into the skin once a week. 2 mL 3    estradiol (ESTRACE) 0.1 MG/GM Vaginal Cream Place 1 g vaginally every evening for 7 days, THEN 1 g twice a week. 42.5 g 3    Estradiol (DIVIGEL) 0.75 MG/0.75GM Transdermal Gel Place 1 packet onto the skin daily. 90 each 2    escitalopram 10 MG Oral Tab Take 1 tablet (10 mg total) by mouth daily. 90 tablet 4    albuterol 108 (90 Base) MCG/ACT Inhalation Aero Soln Inhale 1 puff into the lungs every 6 (six) hours as needed. 18 g 0    rosuvastatin 5 MG Oral Tab Take 1 tablet (5 mg total) by mouth every other day.      ergocalciferol 1.25 MG (77871 UT) Oral Cap       metoprolol succinate ER 25 MG Oral Tablet 24 Hr Take 0.5 tablets (12.5 mg total) by mouth daily. Half of 25mg      SYNTHROID 125 MCG Oral Tab        No current facility-administered medications on file prior to visit.

## 2025-08-12 ENCOUNTER — PATIENT MESSAGE (OUTPATIENT)
Facility: CLINIC | Age: 55
End: 2025-08-12

## 2025-08-13 LAB
CHOL/HDLC RATIO: 3.2 (CALC)
CHOLESTEROL, TOTAL: 167 MG/DL
HDL CHOLESTEROL: 52 MG/DL
HEMOGLOBIN A1C: 5.4 %
LDL-CHOLESTEROL: 98 MG/DL (CALC)
NON-HDL CHOLESTEROL: 115 MG/DL (CALC)
TRIGLYCERIDES: 84 MG/DL
VITAMIN B12: 654 PG/ML (ref 200–1100)
VITAMIN D, 25-OH, TOTAL: 60 NG/ML (ref 30–100)

## 2025-08-15 LAB
ABSOLUTE BASOPHILS: 107 CELLS/UL (ref 0–200)
ABSOLUTE EOSINOPHILS: 243 CELLS/UL (ref 15–500)
ABSOLUTE LYMPHOCYTES: 2648 CELLS/UL (ref 850–3900)
ABSOLUTE MONOCYTES: 534 CELLS/UL (ref 200–950)
ABSOLUTE NEUTROPHILS: 6169 CELLS/UL (ref 1500–7800)
ALBUMIN/GLOBULIN RATIO: 1.5 (CALC) (ref 1–2.5)
ALBUMIN: 3.9 G/DL (ref 3.8–4.8)
ALBUMIN: 4.1 G/DL (ref 3.6–5.1)
ALKALINE PHOSPHATASE: 51 U/L (ref 37–153)
ALPHA-1-GLOBULINS: 0.3 G/DL (ref 0.2–0.3)
ALPHA-2-GLOBULINS: 0.7 G/DL (ref 0.5–0.9)
ALT: 12 U/L (ref 6–29)
APPEARANCE: CLEAR
AST: 12 U/L (ref 10–35)
BASOPHILS: 1.1 %
BETA 1 GLOBULIN: 0.4 G/DL (ref 0.4–0.6)
BETA 2 GLOBULIN: 0.4 G/DL (ref 0.2–0.5)
BILIRUBIN, TOTAL: 0.5 MG/DL (ref 0.2–1.2)
BILIRUBIN: NEGATIVE
BUN: 15 MG/DL (ref 7–25)
C-REACTIVE PROTEIN: 6.2 MG/L
CALCIUM: 9 MG/DL (ref 8.6–10.4)
CARBON DIOXIDE: 26 MMOL/L (ref 20–32)
CHLORIDE: 101 MMOL/L (ref 98–110)
COLOR: YELLOW
COMPLEMENT COMPONENT C3C: 120 MG/DL (ref 83–193)
COMPLEMENT COMPONENT C4C: 27 MG/DL (ref 15–57)
CREATININE, RANDOM URINE: 118 MG/DL (ref 20–275)
CREATININE: 0.62 MG/DL (ref 0.5–1.03)
EGFR: 105 ML/MIN/1.73M2
EOSINOPHILS: 2.5 %
GAMMA GLOBULINS: 1.2 G/DL (ref 0.8–1.7)
GLOBULIN: 2.8 G/DL (CALC) (ref 1.9–3.7)
GLUCOSE: 82 MG/DL (ref 65–99)
GLUCOSE: NEGATIVE
HEMATOCRIT: 43.6 % (ref 35–45)
HEMOGLOBIN: 14.1 G/DL (ref 11.7–15.5)
KETONES: NEGATIVE
LEUKOCYTE ESTERASE: NEGATIVE
LYMPHOCYTES: 27.3 %
MCH: 30.3 PG (ref 27–33)
MCHC: 32.3 G/DL (ref 32–36)
MCV: 93.8 FL (ref 80–100)
MONOCYTES: 5.5 %
MPV: 9.6 FL (ref 7.5–12.5)
NEUTROPHILS: 63.6 %
NITRITE: NEGATIVE
OCCULT BLOOD: NEGATIVE
PH: 8 (ref 5–8)
PLATELET COUNT: 369 THOUSAND/UL (ref 140–400)
POTASSIUM: 4.1 MMOL/L (ref 3.5–5.3)
PROTEIN, TOTAL, RANDOM UR: 11 MG/DL (ref 5–24)
PROTEIN, TOTAL: 6.9 G/DL (ref 6.1–8.1)
PROTEIN, TOTAL: 6.9 G/DL (ref 6.1–8.1)
PROTEIN: NEGATIVE
RDW: 12.5 % (ref 11–15)
RED BLOOD CELL COUNT: 4.65 MILLION/UL (ref 3.8–5.1)
SED RATE BY MODIFIED$WESTERGREN: 9 MM/H
SODIUM: 135 MMOL/L (ref 135–146)
SPECIFIC GRAVITY: 1.01 (ref 1–1.03)
WHITE BLOOD CELL COUNT: 9.7 THOUSAND/UL (ref 3.8–10.8)

## 2025-08-18 ENCOUNTER — OFFICE VISIT (OUTPATIENT)
Dept: RHEUMATOLOGY | Facility: CLINIC | Age: 55
End: 2025-08-18

## 2025-08-18 VITALS
RESPIRATION RATE: 16 BRPM | SYSTOLIC BLOOD PRESSURE: 126 MMHG | HEART RATE: 80 BPM | TEMPERATURE: 97 F | DIASTOLIC BLOOD PRESSURE: 76 MMHG | HEIGHT: 63 IN | WEIGHT: 212.38 LBS | BODY MASS INDEX: 37.63 KG/M2 | OXYGEN SATURATION: 94 %

## 2025-08-18 DIAGNOSIS — R68.2 DRY MOUTH: ICD-10-CM

## 2025-08-18 DIAGNOSIS — H04.129 DRY EYE: ICD-10-CM

## 2025-08-18 DIAGNOSIS — T78.40XA ALLERGY, INITIAL ENCOUNTER: ICD-10-CM

## 2025-08-18 DIAGNOSIS — M35.01 SJOGREN'S SYNDROME WITH KERATOCONJUNCTIVITIS SICCA (HCC): Primary | ICD-10-CM

## 2025-08-18 DIAGNOSIS — R76.8 SS-A ANTIBODY POSITIVE: ICD-10-CM

## 2025-08-18 DIAGNOSIS — R21 RASH: ICD-10-CM

## 2025-08-18 PROBLEM — M35.9 UNDIFFERENTIATED CONNECTIVE TISSUE DISEASE (HCC): Status: RESOLVED | Noted: 2024-07-30 | Resolved: 2025-08-18

## 2025-08-18 PROBLEM — E66.01 CLASS 2 SEVERE OBESITY WITH SERIOUS COMORBIDITY AND BODY MASS INDEX (BMI) OF 36.0 TO 36.9 IN ADULT (HCC): Status: RESOLVED | Noted: 2023-09-28 | Resolved: 2025-08-18

## 2025-08-18 PROBLEM — E66.812 CLASS 2 SEVERE OBESITY WITH SERIOUS COMORBIDITY AND BODY MASS INDEX (BMI) OF 36.0 TO 36.9 IN ADULT (HCC): Status: RESOLVED | Noted: 2023-09-28 | Resolved: 2025-08-18

## 2025-08-18 RX ORDER — EPINEPHRINE 0.15 MG/.3ML
0.15 INJECTION INTRAMUSCULAR AS NEEDED
Qty: 1 EACH | Refills: 12 | Status: SHIPPED | OUTPATIENT
Start: 2025-08-18 | End: 2026-08-18

## 2025-08-18 RX ORDER — PILOCARPINE HYDROCHLORIDE 5 MG/1
TABLET, FILM COATED ORAL
Qty: 249 TABLET | Refills: 1 | Status: SHIPPED | OUTPATIENT
Start: 2025-08-18 | End: 2025-11-16

## (undated) NOTE — Clinical Note
Thank you for referring Sonja to the [facility]. Consult was completed today via clinic.  I have ordered labs, referred for a nutrition consultation with our dietician.  I counseled on the importance of lifestyle intervention in adjunct with medication and increased Zepbound for treatment with follow-up advised in about 4 months.